# Patient Record
Sex: MALE | Race: WHITE | Employment: OTHER | URBAN - METROPOLITAN AREA
[De-identification: names, ages, dates, MRNs, and addresses within clinical notes are randomized per-mention and may not be internally consistent; named-entity substitution may affect disease eponyms.]

---

## 2019-08-03 ENCOUNTER — HOSPITAL ENCOUNTER (EMERGENCY)
Age: 69
Discharge: HOME | End: 2019-08-03
Attending: EMERGENCY MEDICINE
Payer: MEDICARE

## 2019-08-03 VITALS
WEIGHT: 224.21 LBS | HEART RATE: 92 BPM | TEMPERATURE: 97.5 F | DIASTOLIC BLOOD PRESSURE: 93 MMHG | RESPIRATION RATE: 20 BRPM | SYSTOLIC BLOOD PRESSURE: 178 MMHG | OXYGEN SATURATION: 92 % | BODY MASS INDEX: 31.39 KG/M2 | HEIGHT: 71 IN

## 2019-08-03 DIAGNOSIS — R04.0 EPISTAXIS: Primary | ICD-10-CM

## 2019-08-03 PROCEDURE — 99283 EMERGENCY DEPT VISIT LOW MDM: CPT

## 2019-08-03 PROCEDURE — 99284 EMERGENCY DEPT VISIT MOD MDM: CPT

## 2019-08-03 PROCEDURE — 74011250637 HC RX REV CODE- 250/637: Performed by: EMERGENCY MEDICINE

## 2019-08-03 RX ORDER — ASCORBIC ACID 500 MG
500 TABLET ORAL DAILY
COMMUNITY

## 2019-08-03 RX ORDER — CHLORTHALIDONE 25 MG/1
12.5 TABLET ORAL DAILY
COMMUNITY

## 2019-08-03 RX ORDER — GLUCOSAMINE SULFATE 1500 MG
2000 POWDER IN PACKET (EA) ORAL DAILY
COMMUNITY

## 2019-08-03 RX ORDER — ROSUVASTATIN CALCIUM 40 MG/1
40 TABLET, COATED ORAL
COMMUNITY

## 2019-08-03 RX ADMIN — Medication 2 SPRAY: at 09:50

## 2019-08-03 NOTE — ED TRIAGE NOTES
Nosebleed started left nares 9 PM last night, takes Xarelto. Wife states she told him to let her know if it didn't stop by midnight and when she woke up this morning was still bleeding. Patient reports it bled all night long.

## 2019-08-03 NOTE — ED PROVIDER NOTES
Perez Graves is a 78 y.o. male who presents ambulatory to the ED with a c/o epistaxis onset last night. Pt's wife reports that his nose has been bleeding from both nostril for 12 hours now. Of note, pt is on Eliquis. Also to note, pt states that he had similar sx years ago and has received many nerve blocks. Pt reports that he noes she has to get her teeth looked at but has get an appointment. Pt specifically denies chest pain, shortness of breath, n/v,d , fever, chills, numbness, tingling, abdominal pain, back pain, cough, leg swelling, dizziness or any other acute sx. Pt denies any recent travel, known sick contacts, or recent illness. PCP: Alva Lopez MD  PMHx significant for: CAD, AAA, Carotid Stenosis (left), Depression, HTN, HLD, A-Fib, Liver Failure, Macular Degeneration, Anemia, Necrotizing PNA  PSHx significant for: left Carotid Endarterectomy, cataract removal, AAA Repair  Social Hx: Tobacco: Current every day smoker EtOH: occaisional Illicit drug use: none    There are no further complaints or symptoms at this time. Signed by: mai Puga for Fernando Saha MD on August 3rd, 2019 at 9:41am.    The history is provided by the patient, the spouse and medical records. No  was used.         Past Medical History:   Diagnosis Date    AAA (abdominal aortic aneurysm) (HCC)     s/p repair    Anemia     Atrial fibrillation (HCC)     CAD (coronary artery disease)     Carotid stenosis, left     s/p repair    Depression     anger issues    High cholesterol     Hypertension     Liver failure (HCC)     Macular degeneration     Necrotizing pneumonia (HCC)        Past Surgical History:   Procedure Laterality Date    CARDIAC SURG PROCEDURE UNLIST      HX CAROTID ENDARTERECTOMY      HX HEENT      cataract    HX OTHER SURGICAL      Aneurysm repair    VASCULAR SURGERY PROCEDURE UNLIST           Family History:   Problem Relation Age of Onset    Hypertension Other        Social History     Socioeconomic History    Marital status:      Spouse name: Not on file    Number of children: Not on file    Years of education: Not on file    Highest education level: Not on file   Occupational History    Not on file   Social Needs    Financial resource strain: Not on file    Food insecurity:     Worry: Not on file     Inability: Not on file    Transportation needs:     Medical: Not on file     Non-medical: Not on file   Tobacco Use    Smoking status: Current Every Day Smoker     Packs/day: 1.00     Years: 50.00     Pack years: 50.00    Smokeless tobacco: Never Used   Substance and Sexual Activity    Alcohol use: Yes     Alcohol/week: 12.0 standard drinks     Types: 12 Cans of beer per week    Drug use: No    Sexual activity: Not on file   Lifestyle    Physical activity:     Days per week: Not on file     Minutes per session: Not on file    Stress: Not on file   Relationships    Social connections:     Talks on phone: Not on file     Gets together: Not on file     Attends Mandaeism service: Not on file     Active member of club or organization: Not on file     Attends meetings of clubs or organizations: Not on file     Relationship status: Not on file    Intimate partner violence:     Fear of current or ex partner: Not on file     Emotionally abused: Not on file     Physically abused: Not on file     Forced sexual activity: Not on file   Other Topics Concern    Not on file   Social History Narrative    Not on file         ALLERGIES: Patient has no known allergies. Review of Systems   Constitutional: Negative for chills and fever. HENT: Positive for nosebleeds. Respiratory: Negative for cough and shortness of breath. Cardiovascular: Negative for chest pain. Gastrointestinal: Negative for abdominal pain, diarrhea, nausea and vomiting. Neurological: Negative for dizziness and headaches.    All other systems reviewed and are negative. Vitals:    08/03/19 0905   BP: (!) 178/93   Pulse: 92   Resp: 20   Temp: 97.5 °F (36.4 °C)   SpO2: 92%   Weight: 101.7 kg (224 lb 3.3 oz)   Height: 5' 11\" (1.803 m)            Physical Exam   Constitutional: He is oriented to person, place, and time. He appears well-developed and well-nourished. No distress. NAD, AxOx4, speaking in complete sentences  Bleeding from L nare; blood in mouth also   HENT:   Head: Normocephalic and atraumatic. Right Ear: External ear normal.   Mouth/Throat: Oropharynx is clear and moist. No oropharyngeal exudate. Eyes: Pupils are equal, round, and reactive to light. Conjunctivae and EOM are normal. Right eye exhibits no discharge. Left eye exhibits no discharge. Neck: Normal range of motion. Neck supple. Cardiovascular: Normal rate, regular rhythm and intact distal pulses. Exam reveals no gallop and no friction rub. No murmur heard. Pulmonary/Chest: Effort normal and breath sounds normal. No respiratory distress. He has no wheezes. He has no rales. He exhibits no tenderness. Abdominal: Soft. Bowel sounds are normal. He exhibits no distension and no mass. There is no tenderness. There is no rebound and no guarding. Musculoskeletal: Normal range of motion. He exhibits no edema. Lymphadenopathy:     He has no cervical adenopathy. Neurological: He is alert and oriented to person, place, and time. No cranial nerve deficit. Coordination normal.   Skin: Skin is warm and dry. No rash noted. No erythema. Psychiatric: He has a normal mood and affect. Nursing note and vitals reviewed. [facility]       Epistaxis Management  Date/Time: 8/3/2019 9:55 AM  Performed by: Shawn Steen MD  Authorized by: Shawn Steen MD     Consent:     Consent obtained:  Verbal    Consent given by:  Patient    Risks discussed:  Bleeding, infection, nasal injury and pain    Alternatives discussed:  No treatment  Anesthesia (see MAR for exact dosages):      Anesthesia method: None  Procedure details:     Treatment site: blew out clot/ applied kai/     Treatment complexity:  Limited    Treatment episode: initial    Post-procedure details:     Assessment:  Bleeding stopped    Patient tolerance of procedure: Tolerated well, no immediate complications  Comments:      9:55 AM  Blew out clot/ fried egg sized/ applied Kai/ nasal clamp; will monitor 30 min    10:37 AM  Nosebleed resolved;       10:38 AM  Perez Bryant's  results have been reviewed with him. He has been counseled regarding his diagnosis. He verbally conveys understanding and agreement of the signs, symptoms, diagnosis, treatment and prognosis and additionally agrees to Call/ Arrange follow up as recommended with Dr. Kevin Burt MD in 24 - 48 hours. He also agrees with the care-plan and conveys that all of his questions have been answered. I have also put together some discharge instructions for him that include: 1) educational information regarding their diagnosis, 2) how to care for their diagnosis at home, as well a 3) list of reasons why they would want to return to the ED prior to their follow-up appointment, should their condition change or for concerns. Ai Badillo

## 2019-08-03 NOTE — DISCHARGE INSTRUCTIONS
Patient Education        Nosebleeds: Care Instructions  Your Care Instructions    Nosebleeds are common, especially if you have colds or allergies. Many things can cause a nosebleed. Some nosebleeds stop on their own with pressure. Others need packing. Some get cauterized (sealed). If you have gauze or other packing materials in your nose, you will need to follow up with your doctor to have the packing removed. You may need more treatment if you get nosebleeds a lot. The doctor has checked you carefully, but problems can develop later. If you notice any problems or new symptoms, get medical treatment right away. Follow-up care is a key part of your treatment and safety. Be sure to make and go to all appointments, and call your doctor if you are having problems. It's also a good idea to know your test results and keep a list of the medicines you take. How can you care for yourself at home? · If you get another nosebleed:  ? Sit up and tilt your head slightly forward. This keeps blood from going down your throat. ? Use your thumb and index finger to pinch your nose shut for 10 minutes. Use a clock. Do not check to see if the bleeding has stopped before the 10 minutes are up. If the bleeding has not stopped, pinch your nose shut for another 10 minutes. ? When the bleeding has stopped, try not to pick, rub, or blow your nose for 12 hours. Avoiding these things helps keep your nose from bleeding again. · If your doctor prescribed antibiotics, take them as directed. Do not stop taking them just because you feel better. You need to take the full course of antibiotics. To prevent nosebleeds  · Do not blow your nose too hard. · Try not to lift or strain after a nosebleed. · Raise your head on a pillow while you sleep. · Put a thin layer of a saline- or water-based nasal gel, such as NasoGel, inside your nose. Put it on the septum, which divides your nostrils.  This will prevent dryness that can cause nosebleeds. · Use a vaporizer or humidifier to add moisture to your bedroom. Follow the directions for cleaning the machine. · Do not use aspirin, ibuprofen (Advil, Motrin), or naproxen (Aleve) for 36 to 48 hours after a nosebleed unless your doctor tells you to. You can use acetaminophen (Tylenol) for pain relief. · Talk to your doctor about stopping any other medicines you are taking. Some medicines may make you more likely to get a nosebleed. · Do not use cold medicines or nasal sprays without first talking to your doctor. They can make your nose dry. When should you call for help? Call 911 anytime you think you may need emergency care. For example, call if:    · You passed out (lost consciousness).    Call your doctor now or seek immediate medical care if:    · You get another nosebleed and your nose is still bleeding after you have applied pressure 3 times for 10 minutes each time (30 minutes total).     · There is a lot of blood running down the back of your throat even after you pinch your nose and tilt your head forward.     · You have a fever.     · You have sinus pain.    Watch closely for changes in your health, and be sure to contact your doctor if:    · You get nosebleeds often, even if they stop.     · You do not get better as expected. Where can you learn more? Go to http://beni-em.info/. Enter S156 in the search box to learn more about \"Nosebleeds: Care Instructions. \"  Current as of: September 23, 2018  Content Version: 12.1  © 3118-1732 [employer]. Care instructions adapted under license by [employer] (which disclaims liability or warranty for this information). If you have questions about a medical condition or this instruction, always ask your healthcare professional. [place] [address] any warranty or liability for your use of this information.        Patient Education      Rivaroxaban (Xarelto, Xarelto Starter Pack) - (By mouth)   Why this medicine is used:   Treats and prevents blood clots. Contact a nurse or doctor right away if you have:  · Sudden or severe headache  · Back pain, numbness, tingling, weakness in your legs or feet  · Loss of bladder or bowel control  · Bloody vomit or vomit that looks like coffee grounds; bloody or black, tarry stools  · Bleeding that does not stop or bruises that do not heal     Common side effects:  · Minor bleeding or bruising  © 2017 [facility] Information is for End User's use only and may not be sold, redistributed or otherwise used for commercial purposes.

## 2022-07-07 ENCOUNTER — APPOINTMENT (OUTPATIENT)
Dept: GENERAL RADIOLOGY | Age: 72
DRG: 641 | End: 2022-07-07
Attending: EMERGENCY MEDICINE
Payer: MEDICARE

## 2022-07-07 ENCOUNTER — HOSPITAL ENCOUNTER (INPATIENT)
Age: 72
LOS: 1 days | Discharge: HOME | DRG: 641 | End: 2022-07-08
Attending: STUDENT IN AN ORGANIZED HEALTH CARE EDUCATION/TRAINING PROGRAM | Admitting: INTERNAL MEDICINE
Payer: MEDICARE

## 2022-07-07 DIAGNOSIS — I50.9 ACUTE ON CHRONIC CONGESTIVE HEART FAILURE, UNSPECIFIED HEART FAILURE TYPE (HCC): Primary | ICD-10-CM

## 2022-07-07 DIAGNOSIS — I48.91 ATRIAL FIBRILLATION WITH RAPID VENTRICULAR RESPONSE (HCC): ICD-10-CM

## 2022-07-07 LAB
ALBUMIN SERPL-MCNC: 3.6 G/DL (ref 3.5–5)
ALBUMIN/GLOB SERPL: 0.9 {RATIO} (ref 1.1–2.2)
ALP SERPL-CCNC: 112 U/L (ref 45–117)
ALT SERPL-CCNC: 25 U/L (ref 12–78)
ANION GAP SERPL CALC-SCNC: 10 MMOL/L (ref 5–15)
AST SERPL-CCNC: 28 U/L (ref 15–37)
BASOPHILS # BLD: 0.1 K/UL (ref 0–0.1)
BASOPHILS NFR BLD: 1 % (ref 0–1)
BILIRUB DIRECT SERPL-MCNC: 0.1 MG/DL (ref 0–0.2)
BILIRUB SERPL-MCNC: 0.4 MG/DL (ref 0.2–1)
BNP SERPL-MCNC: 3584 PG/ML
BUN SERPL-MCNC: 18 MG/DL (ref 6–20)
BUN/CREAT SERPL: 12 (ref 12–20)
CALCIUM SERPL-MCNC: 9.3 MG/DL (ref 8.5–10.1)
CHLORIDE SERPL-SCNC: 102 MMOL/L (ref 97–108)
CO2 SERPL-SCNC: 27 MMOL/L (ref 21–32)
CREAT SERPL-MCNC: 1.48 MG/DL (ref 0.7–1.3)
DIFFERENTIAL METHOD BLD: ABNORMAL
EOSINOPHIL # BLD: 0.3 K/UL (ref 0–0.4)
EOSINOPHIL NFR BLD: 3 % (ref 0–7)
ERYTHROCYTE [DISTWIDTH] IN BLOOD BY AUTOMATED COUNT: 18.6 % (ref 11.5–14.5)
GLOBULIN SER CALC-MCNC: 4.1 G/DL (ref 2–4)
GLUCOSE SERPL-MCNC: 156 MG/DL (ref 65–100)
HCT VFR BLD AUTO: 38.6 % (ref 36.6–50.3)
HGB BLD-MCNC: 11.6 G/DL (ref 12.1–17)
IMM GRANULOCYTES # BLD AUTO: 0.1 K/UL (ref 0–0.04)
IMM GRANULOCYTES NFR BLD AUTO: 1 % (ref 0–0.5)
LYMPHOCYTES # BLD: 1.9 K/UL (ref 0.8–3.5)
LYMPHOCYTES NFR BLD: 19 % (ref 12–49)
MAGNESIUM SERPL-MCNC: 1.9 MG/DL (ref 1.6–2.4)
MCH RBC QN AUTO: 22.9 PG (ref 26–34)
MCHC RBC AUTO-ENTMCNC: 30.1 G/DL (ref 30–36.5)
MCV RBC AUTO: 76.3 FL (ref 80–99)
MONOCYTES # BLD: 0.8 K/UL (ref 0–1)
MONOCYTES NFR BLD: 8 % (ref 5–13)
NEUTS SEG # BLD: 6.7 K/UL (ref 1.8–8)
NEUTS SEG NFR BLD: 68 % (ref 32–75)
NRBC # BLD: 0.02 K/UL (ref 0–0.01)
NRBC BLD-RTO: 0.2 PER 100 WBC
PLATELET # BLD AUTO: 457 K/UL (ref 150–400)
PMV BLD AUTO: 9.8 FL (ref 8.9–12.9)
POTASSIUM SERPL-SCNC: 3.2 MMOL/L (ref 3.5–5.1)
PROT SERPL-MCNC: 7.7 G/DL (ref 6.4–8.2)
RBC # BLD AUTO: 5.06 M/UL (ref 4.1–5.7)
SODIUM SERPL-SCNC: 139 MMOL/L (ref 136–145)
TROPONIN-HIGH SENSITIVITY: 28 NG/L (ref 0–76)
TROPONIN-HIGH SENSITIVITY: 35 NG/L (ref 0–76)
WBC # BLD AUTO: 9.9 K/UL (ref 4.1–11.1)

## 2022-07-07 PROCEDURE — 74011000250 HC RX REV CODE- 250: Performed by: INTERNAL MEDICINE

## 2022-07-07 PROCEDURE — 51798 US URINE CAPACITY MEASURE: CPT

## 2022-07-07 PROCEDURE — 99285 EMERGENCY DEPT VISIT HI MDM: CPT

## 2022-07-07 PROCEDURE — 94640 AIRWAY INHALATION TREATMENT: CPT

## 2022-07-07 PROCEDURE — 74011250636 HC RX REV CODE- 250/636: Performed by: INTERNAL MEDICINE

## 2022-07-07 PROCEDURE — 83735 ASSAY OF MAGNESIUM: CPT

## 2022-07-07 PROCEDURE — 85025 COMPLETE CBC W/AUTO DIFF WBC: CPT

## 2022-07-07 PROCEDURE — 93005 ELECTROCARDIOGRAM TRACING: CPT

## 2022-07-07 PROCEDURE — 71046 X-RAY EXAM CHEST 2 VIEWS: CPT

## 2022-07-07 PROCEDURE — 80048 BASIC METABOLIC PNL TOTAL CA: CPT

## 2022-07-07 PROCEDURE — 74011000250 HC RX REV CODE- 250: Performed by: EMERGENCY MEDICINE

## 2022-07-07 PROCEDURE — 83880 ASSAY OF NATRIURETIC PEPTIDE: CPT

## 2022-07-07 PROCEDURE — 74011250637 HC RX REV CODE- 250/637: Performed by: INTERNAL MEDICINE

## 2022-07-07 PROCEDURE — 84484 ASSAY OF TROPONIN QUANT: CPT

## 2022-07-07 PROCEDURE — 65270000046 HC RM TELEMETRY

## 2022-07-07 PROCEDURE — 36415 COLL VENOUS BLD VENIPUNCTURE: CPT

## 2022-07-07 PROCEDURE — 80076 HEPATIC FUNCTION PANEL: CPT

## 2022-07-07 RX ORDER — BUMETANIDE 0.25 MG/ML
1 INJECTION INTRAMUSCULAR; INTRAVENOUS ONCE
Status: COMPLETED | OUTPATIENT
Start: 2022-07-07 | End: 2022-07-07

## 2022-07-07 RX ORDER — MAGNESIUM SULFATE HEPTAHYDRATE 40 MG/ML
2 INJECTION, SOLUTION INTRAVENOUS ONCE
Status: COMPLETED | OUTPATIENT
Start: 2022-07-08 | End: 2022-07-08

## 2022-07-07 RX ORDER — ONDANSETRON 4 MG/1
4 TABLET, ORALLY DISINTEGRATING ORAL
Status: DISCONTINUED | OUTPATIENT
Start: 2022-07-07 | End: 2022-07-08 | Stop reason: HOSPADM

## 2022-07-07 RX ORDER — METOPROLOL SUCCINATE 50 MG/1
50 TABLET, EXTENDED RELEASE ORAL DAILY
Status: DISCONTINUED | OUTPATIENT
Start: 2022-07-08 | End: 2022-07-08 | Stop reason: HOSPADM

## 2022-07-07 RX ORDER — ONDANSETRON 2 MG/ML
4 INJECTION INTRAMUSCULAR; INTRAVENOUS
Status: DISCONTINUED | OUTPATIENT
Start: 2022-07-07 | End: 2022-07-08 | Stop reason: HOSPADM

## 2022-07-07 RX ORDER — IPRATROPIUM BROMIDE 0.5 MG/2.5ML
0.5 SOLUTION RESPIRATORY (INHALATION)
Status: DISCONTINUED | OUTPATIENT
Start: 2022-07-08 | End: 2022-07-08 | Stop reason: HOSPADM

## 2022-07-07 RX ORDER — BUMETANIDE 2 MG/1
1 TABLET ORAL DAILY
COMMUNITY
End: 2022-07-08

## 2022-07-07 RX ORDER — DULOXETIN HYDROCHLORIDE 30 MG/1
60 CAPSULE, DELAYED RELEASE ORAL DAILY
Status: DISCONTINUED | OUTPATIENT
Start: 2022-07-08 | End: 2022-07-08 | Stop reason: HOSPADM

## 2022-07-07 RX ORDER — DULOXETIN HYDROCHLORIDE 60 MG/1
60 CAPSULE, DELAYED RELEASE ORAL DAILY
COMMUNITY

## 2022-07-07 RX ORDER — ROSUVASTATIN CALCIUM 10 MG/1
40 TABLET, COATED ORAL
Status: DISCONTINUED | OUTPATIENT
Start: 2022-07-07 | End: 2022-07-08 | Stop reason: HOSPADM

## 2022-07-07 RX ORDER — SODIUM CHLORIDE 0.9 % (FLUSH) 0.9 %
5-40 SYRINGE (ML) INJECTION AS NEEDED
Status: DISCONTINUED | OUTPATIENT
Start: 2022-07-07 | End: 2022-07-08 | Stop reason: HOSPADM

## 2022-07-07 RX ORDER — LANOLIN ALCOHOL/MO/W.PET/CERES
325 CREAM (GRAM) TOPICAL DAILY
COMMUNITY

## 2022-07-07 RX ORDER — POLYETHYLENE GLYCOL 3350 17 G/17G
17 POWDER, FOR SOLUTION ORAL DAILY PRN
Status: DISCONTINUED | OUTPATIENT
Start: 2022-07-07 | End: 2022-07-08 | Stop reason: HOSPADM

## 2022-07-07 RX ORDER — EZETIMIBE 10 MG/1
10 TABLET ORAL DAILY
Status: DISCONTINUED | OUTPATIENT
Start: 2022-07-08 | End: 2022-07-08 | Stop reason: HOSPADM

## 2022-07-07 RX ORDER — EZETIMIBE 10 MG/1
10 TABLET ORAL DAILY
COMMUNITY

## 2022-07-07 RX ORDER — SODIUM CHLORIDE 0.9 % (FLUSH) 0.9 %
5-40 SYRINGE (ML) INJECTION EVERY 8 HOURS
Status: DISCONTINUED | OUTPATIENT
Start: 2022-07-07 | End: 2022-07-08 | Stop reason: HOSPADM

## 2022-07-07 RX ORDER — FLUTICASONE FUROATE AND VILANTEROL 200; 25 UG/1; UG/1
1 POWDER RESPIRATORY (INHALATION) DAILY
COMMUNITY

## 2022-07-07 RX ORDER — POTASSIUM CHLORIDE 750 MG/1
40 TABLET, FILM COATED, EXTENDED RELEASE ORAL
Status: COMPLETED | OUTPATIENT
Start: 2022-07-07 | End: 2022-07-07

## 2022-07-07 RX ORDER — LANOLIN ALCOHOL/MO/W.PET/CERES
325 CREAM (GRAM) TOPICAL
Status: DISCONTINUED | OUTPATIENT
Start: 2022-07-08 | End: 2022-07-08 | Stop reason: HOSPADM

## 2022-07-07 RX ORDER — POTASSIUM CHLORIDE 750 MG/1
20 TABLET, FILM COATED, EXTENDED RELEASE ORAL DAILY
COMMUNITY

## 2022-07-07 RX ADMIN — SODIUM CHLORIDE, PRESERVATIVE FREE 10 ML: 5 INJECTION INTRAVENOUS at 23:26

## 2022-07-07 RX ADMIN — MAGNESIUM SULFATE HEPTAHYDRATE 2 G: 40 INJECTION, SOLUTION INTRAVENOUS at 23:32

## 2022-07-07 RX ADMIN — RIVAROXABAN 20 MG: 20 TABLET, FILM COATED ORAL at 18:32

## 2022-07-07 RX ADMIN — POTASSIUM CHLORIDE 40 MEQ: 750 TABLET, FILM COATED, EXTENDED RELEASE ORAL at 15:11

## 2022-07-07 RX ADMIN — SODIUM CHLORIDE, PRESERVATIVE FREE 10 ML: 5 INJECTION INTRAVENOUS at 15:11

## 2022-07-07 RX ADMIN — BUMETANIDE 1 MG: 0.25 INJECTION INTRAMUSCULAR; INTRAVENOUS at 15:11

## 2022-07-07 RX ADMIN — ARFORMOTEROL TARTRATE: 15 SOLUTION RESPIRATORY (INHALATION) at 22:45

## 2022-07-07 NOTE — PROGRESS NOTES
1430 - pt arrived from the waiting room, cardiology in room, will consult pt's current cardiology team, post void Bladder scan 120cc, Hospitalist @ bedside    1500 - Wife leaving to go home & check on the dogs    1600 - assessment complete, pt voided    1800 - labs drawn & sent    8376 - Cardiology consult called again, Dr Ahmet Patton

## 2022-07-07 NOTE — PROGRESS NOTES
[facility]: MED RECONCILIATION        Medications added:   Zetia  Spiriva, Breo ellipta  Bumex  KCl supplement, Tumeric, Bererine    Medications removed:  IMDUR 30mg daily  Lactulose 30ml TID  Lisinopril 5mg BID  Prilosec daily  Fish Oil    Medications adjusted:  Duloxetine  Ferrous supplement  Vit D3    Information obtained from: Patient med list posted in MD note, RxQuery (data available)      Allergies: Patient has no known allergies. Prior to Admission Medications:     Medication Documentation Review Audit       Reviewed by Soni Buchanan, PHARMD (Pharmacist) on 07/07/22 at [phone]      Medication Sig Documenting Provider Last Dose Status Taking? albuterol (PROVENTIL HFA) 90 mcg/actuation inhaler Take 1 Puff by inhalation every four (4) hours as needed for Wheezing. Marissa Edmonds PA  Active Yes   ascorbic acid, vitamin C, (VITAMIN C) 500 mg tablet Take 500 mg by mouth daily. Richy Lea MD  Active Yes   berberine/herbal complex no.18 (BERBERINE-HERBAL COMB NO.18 PO) Take 2 Capsules by mouth daily. Provider, Historical  Active Yes   bumetanide (BUMEX) 2 mg tablet Take 1 mg by mouth daily. Provider, Historical  Active Yes   chlorthalidone (HYGROTEN) 25 mg tablet Take 12.5 mg by mouth daily. Richy Lea MD  Active Yes   cholecalciferol (VITAMIN D3) 1,000 unit cap Take 2,000 Units by mouth daily. Richy Lea MD  Active Yes   DULoxetine (CYMBALTA) 60 mg capsule Take 60 mg by mouth daily. Provider, Historical  Active Yes   ezetimibe (Zetia) 10 mg tablet Take 10 mg by mouth daily. Provider, Historical  Active Yes   ferrous sulfate 325 mg (65 mg iron) tablet Take 325 mg by mouth daily. Provider, Historical  Active Yes   fluticasone furoate-vilanteroL (Breo Ellipta) 200-25 mcg/dose inhaler Take 1 Puff by inhalation daily. Provider, Historical  Active Yes   metoprolol succinate (TOPROL-XL) 50 mg XL tablet Take 50 mg by mouth daily.  Richy Lea MD  Active Yes   potassium chloride SR (KLOR-CON 10) 10 mEq tablet Take 20 mEq by mouth daily. Provider, Historical  Active Yes   rivaroxaban (XARELTO) 20 mg tab tablet Take 20 mg by mouth daily (with dinner). Other, MD Richy  Active Yes   rosuvastatin (CRESTOR) 40 mg tablet Take 40 mg by mouth nightly. Other, MD Richy  Active Yes   tiotropium bromide (Spiriva Respimat) 2.5 mcg/actuation inhaler Take 2 Puffs by inhalation daily. Provider, Historical  Active Yes   TURMERIC PO Take 1 Capsule by mouth daily. Provider, Historical  Active Yes   vit A/vit C/vit E/zinc/copper (ICAPS AREDS PO) Take 2 Tabs by mouth daily. Other, MD Richy  Active Yes                    Kristen Jackson

## 2022-07-07 NOTE — ED TRIAGE NOTES
Patient arrives with complaints of not urinating     Wife reports patient has been taking diuretics and has voided 1x in 30 hours

## 2022-07-07 NOTE — H&P
[facility]awad Rae[facility]  [address]  [phone]    Admission History and Physical      NAME:  Davis Heck   :   1950   MRN:  [id number]     PCP:  Lauryn Talbot MD     Date/Time of service:  2022          Subjective:     CHIEF COMPLAINT: Not urinating, dyspnea    HISTORY OF PRESENT ILLNESS:     Mr. Gildardo Eller is a 67 y.o.  male atrial fibrillation on Xarelto, coronary artery disease, peripheral artery disease, OPD, AAA, hypertension, hyperlipidemia, macular degeneration who is admitted with dyspnea. Mr. Gildardo Eller is accompanied by by his wife is at bedside and helps provide history. Wife states that patient visited the ER last Friday and he was advised to be admitted for heart failure however he declined. She states that yesterday evening he told her he has been unable to urinate for the past 2 days. He stated to the emergency room that he was having chest pain with exertion; however he denies any during our encounter. He denies any syncope. He states he is unable to feel his heart palpitations. He denies any increased lower extremity swelling. He denies any abdominal pain nausea vomiting. No Known Allergies    Prior to Admission medications    Medication Sig Start Date End Date Taking? Authorizing Provider   ferrous sulfate 325 mg (65 mg iron) tablet Take 325 mg by mouth daily. Yes Provider, Historical   bumetanide (BUMEX) 2 mg tablet Take 1 mg by mouth daily. Yes Provider, Historical   ezetimibe (Zetia) 10 mg tablet Take 10 mg by mouth daily. Yes Provider, Historical   fluticasone furoate-vilanteroL (Breo Ellipta) 200-25 mcg/dose inhaler Take 1 Puff by inhalation daily. Yes Provider, Historical   tiotropium bromide (Spiriva Respimat) 2.5 mcg/actuation inhaler Take 2 Puffs by inhalation daily. Yes Provider, Historical   DULoxetine (CYMBALTA) 60 mg capsule Take 60 mg by mouth daily.    Yes Provider, Historical   potassium chloride SR (KLOR-CON 10) 10 mEq tablet Take 20 mEq by mouth daily. Yes Provider, Historical   TURMERIC PO Take 1 Capsule by mouth daily. Yes Provider, Historical   berberine/herbal complex no.18 (BERBERINE-HERBAL COMB NO.18 PO) Take 2 Capsules by mouth daily. Yes Provider, Historical   rivaroxaban (XARELTO) 20 mg tab tablet Take 20 mg by mouth daily (with dinner). Yes Other, MD Richy   chlorthalidone (HYGROTEN) 25 mg tablet Take 12.5 mg by mouth daily. Yes Venu, MD Richy   rosuvastatin (CRESTOR) 40 mg tablet Take 40 mg by mouth nightly. Yes Venu, MD Richy   ascorbic acid, vitamin C, (VITAMIN C) 500 mg tablet Take 500 mg by mouth daily. Yes Venu, MD Richy   vit A/vit C/vit E/zinc/copper (ICAPS AREDS PO) Take 2 Tabs by mouth daily. Yes Venu, MD Richy   cholecalciferol (VITAMIN D3) 1,000 unit cap Take 2,000 Units by mouth daily. Yes Venu, MD Richy   albuterol (PROVENTIL HFA) 90 mcg/actuation inhaler Take 1 Puff by inhalation every four (4) hours as needed for Wheezing. 10/4/15  Yes Cristiano Edmonds PA   metoprolol succinate (TOPROL-XL) 50 mg XL tablet Take 50 mg by mouth daily. Yes Other, MD Richy       Past Medical History:   Diagnosis Date    AAA (abdominal aortic aneurysm) ([facility] [address]     s/p repair    Anemia     Atrial fibrillation (HCC)     CAD (coronary artery disease)     Carotid stenosis, left     s/p repair    Depression     anger issues    High cholesterol     Hypertension     Liver failure (HCC)     Macular degeneration     Necrotizing pneumonia (HCC)         Past Surgical History:   Procedure Laterality Date    CARDIAC SURG PROCEDURE UNLIST      HX CAROTID ENDARTERECTOMY      HX HEENT      cataract    HX OTHER SURGICAL      Aneurysm repair    VASCULAR SURGERY PROCEDURE UNLIST         Social History     Tobacco Use    Smoking status: Current Every Day Smoker     Packs/day: 1.00     Years: 50.00     Pack years: 50.00    Smokeless tobacco: Never Used   Substance Use Topics    Alcohol use:  Yes Alcohol/week: 12.0 standard drinks     Types: 12 Cans of beer per week        Family History   Problem Relation Age of Onset    Hypertension Other    family Hx Father hx of Carotid Artery Stenosis; Cerebrovascular Accident. Mother: Medical history unknown. Review of Systems:  (bold if positive, if negative)    Gen:  Eyes:  ENT:  CVS:  Pulm:   dyspnea,GI:  :  trouble urinating  MS:  Skin:  Psych:  Endo:  Hem:  Renal:  Neuro:            Objective:      VITALS:    Vital signs reviewed; most recent are:    Visit Vitals  /69   Pulse (!) 103   Temp 97.8 °F (36.6 °C)   Resp 25   Ht 5' 10\" (1.778 m)   Wt 93 kg (205 lb)   SpO2 95%   BMI 29.41 kg/m²     SpO2 Readings from Last 6 Encounters:   07/07/22 95%   08/03/19 92%   10/04/15 93%   03/11/14 94%   01/31/14 94%            Intake/Output Summary (Last 24 hours) at 7/7/2022 1642  Last data filed at 7/7/2022 1630  Gross per 24 hour   Intake --   Output 500 ml   Net -500 ml        Exam:     Physical Exam:    Gen:  Well-developed, well-nourished, in no acute distress  HEENT:  Pink conjunctivae, PERRL, hard of hearing  Resp:  No accessory muscle use, crackles bilateral bases  Card:  RRR, No murmurs, normal S1, S2, bilateral peripheral edema  Abd:  Soft, non-tender, non-distended, normoactive bowel sounds are present  Musc:  No cyanosis or clubbing  Skin:  No rashes or ulcers, skin turgor is good  Neuro:  Cranial nerves 3-12 are grossly intact, follows commands appropriately  Psych:  Oriented to person, place, and time, Alert with good insight      Labs:    Recent Labs     07/07/22  1053   WBC 9.9   HGB 11.6*   HCT 38.6   *     Recent Labs     07/07/22  1053      K 3.2*      CO2 27   *   BUN 18   CREA 1.48*   CA 9.3     No results found for: GLUCPOC  No results for input(s): PH, PCO2, PO2, HCO3, FIO2 in the last 72 hours. No results for input(s): INR, INREXT in the last 72 hours.     Radiology and EKG reviewed: Chest x-ray with interstitial prominence     Old Records reviewed in [facility]       Assessment/Plan:      Dyspnea/ Acute on chronic heart failure (HCC) (7/7/2022)/ Coronary atherosclerosis of native coronary artery (1/28/2014): Unclear trigger. Chest x-ray with interstitial prominence and elevated proBNP. Initial troponin negative; continue to trend. Check echo. Status post IV Bumex; continue as tolerated. Check respiratory viral panel develops any fever. Monitor ins and outs. Cardiology consult. BERONICA: Suspect cardiorenal due to heart failure. Monitor renal function with diuretics. Atrial fibrillation: HR slightly elevated. Continue home beta-blocker. Continue home Xarelto    COPD: Does not appear in acute exacerbation. arformoterol and budesonide in place of home Breo. Continue Spiriva. Concern for urinary retention: No current evidence of any urinary retention. Continue to monitor post voids    Hypertension: Continue home beta-blocker. Diuretics as above. Hyperlipidemia: Continue home Zetia and statin    Anemia: Continue home iron. PAD (peripheral artery disease) ([facility] [address] (1/28/2014): Continue home Xarelto and statin. AAA: Follow-up outpatient. Depression: Continue home Cymbalta.        Risk of deterioration: high      Total time spent with patient: 48 Minutes **I personally saw and examined the patient during this time period**                 Care Plan discussed with: Patient, Family and Nursing Staff    Discussed:  Care Plan    Prophylaxis: Xarelto    Probable Disposition:  Home w/Family           ___________________________________________________    Attending Physician: Nitish Addison DO

## 2022-07-07 NOTE — ED PROVIDER NOTES
29-year-old male with a history of anemia, atrial fibrillation, CAD, AAA status postrepair, liver failure, necrotizing pneumonia presents with chest pain and shortness of breath for the past week. States that chest pain is worse with exertion. He also endorses orthopnea. He checked in for urinary retention. He states that he he has not gone for over 30 hours. He tells me that he was recently in [place] in a hospital and was diagnosed with a heart attack or congestive heart failure. He states that he stayed there for 2 hours and did not stay. History is limited as the patient is extremely hard of hearing. Urinary Retention          Past Medical History:   Diagnosis Date    AAA (abdominal aortic aneurysm) (HCC)     s/p repair    Anemia     Atrial fibrillation ([address]     CAD (coronary artery disease)     Carotid stenosis, left     s/p repair    Depression     anger issues    High cholesterol     Hypertension     Liver failure (HCC)     Macular degeneration     Necrotizing pneumonia (HCC)        Past Surgical History:   Procedure Laterality Date    CARDIAC SURG PROCEDURE UNLIST      HX CAROTID ENDARTERECTOMY      HX HEENT      cataract    HX OTHER SURGICAL      Aneurysm repair    VASCULAR SURGERY PROCEDURE UNLIST           Family History:   Problem Relation Age of Onset    Hypertension Other        Social History     Socioeconomic History    Marital status:      Spouse name: Not on file    Number of children: Not on file    Years of education: Not on file    Highest education level: Not on file   Occupational History    Not on file   Tobacco Use    Smoking status: Current Every Day Smoker     Packs/day: 1.00     Years: 50.00     Pack years: 50.00    Smokeless tobacco: Never Used   Substance and Sexual Activity    Alcohol use:  Yes     Alcohol/week: 12.0 standard drinks     Types: 12 Cans of beer per week    Drug use: No    Sexual activity: Not on file   Other Topics Concern  Not on file   Social History Narrative    Not on file     Social Determinants of Health     Financial Resource Strain:     Difficulty of Paying Living Expenses: Not on file   Food Insecurity:     Worried About Running Out of Food in the Last Year: Not on file    Becki of Food in the Last Year: Not on file   Transportation Needs:     Lack of Transportation (Medical): Not on file    Lack of Transportation (Non-Medical): Not on file   Physical Activity:     Days of Exercise per Week: Not on file    Minutes of Exercise per Session: Not on file   Stress:     Feeling of Stress : Not on file   Social Connections:     Frequency of Communication with Friends and Family: Not on file    Frequency of Social Gatherings with Friends and Family: Not on file    Attends [employer] Services: Not on file    Active Member of [address] [place] [address] [employer] or Organizations: Not on file    Attends Club or Organization Meetings: Not on file    Marital Status: Not on file   Intimate Partner Violence:     Fear of Current or Ex-Partner: Not on file    Emotionally Abused: Not on file    Physically Abused: Not on file    Sexually Abused: Not on file   Housing Stability:     Unable to Pay for Housing in the Last Year: Not on file    Number of [place] in the Last Year: Not on file    Unstable Housing in the Last Year: Not on file                     ALLERGIES: Patient has no known allergies. Review of Systems   Reason unable to perform ROS: Extremely hard of hearing. Vitals:    07/07/22 1025   BP: (!) 118/56   Pulse: 92   Resp: 19   Temp: 98 °F (36.7 °C)   SpO2: 97%   Weight: 93 kg (205 lb)   Height: 5' 10\" (1.778 m)            Physical Exam  Vitals and nursing note reviewed. Constitutional:       General: He is not in acute distress. HENT:      Head: Normocephalic and atraumatic. Eyes:      General: No scleral icterus. Conjunctiva/sclera: Conjunctivae normal.      Pupils: Pupils are equal, round, and reactive to light. Neck:      Trachea: No tracheal deviation. Cardiovascular:      Rate and Rhythm: Normal rate and regular rhythm. Pulmonary:      Effort: Pulmonary effort is normal. No respiratory distress. Breath sounds: No stridor. Rales (Bilateral lower) present. Abdominal:      General: There is no distension. Palpations: Abdomen is soft. Tenderness: There is no abdominal tenderness. Genitourinary:     Comments: deferred  Musculoskeletal:         General: No deformity. Cervical back: No rigidity. Skin:     General: Skin is warm and dry. Neurological:      General: No focal deficit present. Mental Status: He is alert. Psychiatric:         Mood and Affect: Mood normal.         Behavior: Behavior normal.          [facility]  ED Course as of 07/07/22 1222   Thu Jul 07, 2022   86 70-year-old male presents with chest pain, shortness of breath, lack of urination with differential diagnosis of acute renal failure, congestive heart failure, ACS, urinary retention. Bladder scan performed upon arrival and showed 40 cc. [TT]   1046 EKG shows atrial fibrillation at rate of 127, left axis deviation, nonspecific ST and T wave abnormalities [TT]   1120 Chest x-ray per my interpretation shows airway midline, no obvious bony deformity, normal cardiac silhouette, no pleural effusions, no free air, no pneumothorax, moderate pulmonary vascular congestion and pulmonary edema, normal mediastinum   [TT]   1221 Patient remains in the waiting room due to no staffed ED beds. He remains in no acute distress. His proBNP is returned elevated. His EKG shows rapid atrial fibrillation. Will admit for further management. [TT]      ED Course User Index  [TT] Anderson Su MD       Procedures          Perfect Serve Consult for Admission  12:22 PM    ED Room Number: CW/CW  Patient Name and age:  Maurice Salinas 67 y.o.  male  Working Diagnosis:   1.  Acute on chronic congestive heart failure, unspecified heart failure type ([facility]    2. Atrial fibrillation with rapid ventricular response ([facility]        COVID-19 Suspicion:  no  Sepsis present:  no  Reassessment needed: N/A  Code Status:  Full Code  Readmission: no  Isolation Requirements:  no  Recommended Level of Care:  telemetry  Department: [facility] - [phone]  Other: Giving Bumex. May need rate control. Patient still in waiting room and not being monitored but will reassess when he comes back. Flori Vaca.  Jose L Truong MD

## 2022-07-08 ENCOUNTER — APPOINTMENT (OUTPATIENT)
Dept: NON INVASIVE DIAGNOSTICS | Age: 72
DRG: 641 | End: 2022-07-08
Attending: INTERNAL MEDICINE
Payer: MEDICARE

## 2022-07-08 VITALS
TEMPERATURE: 97.9 F | HEIGHT: 70 IN | WEIGHT: 205.03 LBS | HEART RATE: 88 BPM | DIASTOLIC BLOOD PRESSURE: 73 MMHG | RESPIRATION RATE: 16 BRPM | BODY MASS INDEX: 29.35 KG/M2 | SYSTOLIC BLOOD PRESSURE: 127 MMHG | OXYGEN SATURATION: 95 %

## 2022-07-08 LAB
ALBUMIN SERPL-MCNC: 3.1 G/DL (ref 3.5–5)
ALBUMIN/GLOB SERPL: 0.9 {RATIO} (ref 1.1–2.2)
ALP SERPL-CCNC: 92 U/L (ref 45–117)
ALT SERPL-CCNC: 20 U/L (ref 12–78)
ANION GAP SERPL CALC-SCNC: 8 MMOL/L (ref 5–15)
AST SERPL-CCNC: 20 U/L (ref 15–37)
BASOPHILS # BLD: 0.1 K/UL (ref 0–0.1)
BASOPHILS NFR BLD: 1 % (ref 0–1)
BILIRUB SERPL-MCNC: 0.5 MG/DL (ref 0.2–1)
BUN SERPL-MCNC: 19 MG/DL (ref 6–20)
BUN/CREAT SERPL: 18 (ref 12–20)
CALCIUM SERPL-MCNC: 8.4 MG/DL (ref 8.5–10.1)
CHLORIDE SERPL-SCNC: 104 MMOL/L (ref 97–108)
CO2 SERPL-SCNC: 29 MMOL/L (ref 21–32)
CREAT SERPL-MCNC: 1.03 MG/DL (ref 0.7–1.3)
DIFFERENTIAL METHOD BLD: ABNORMAL
ECHO AO ASC DIAM: 3.2 CM
ECHO AO ASCENDING AORTA INDEX: 1.52 CM/M2
ECHO AV AREA PEAK VELOCITY: 2.2 CM2
ECHO AV AREA PEAK VELOCITY: 2.3 CM2
ECHO AV AREA VTI: 2.6 CM2
ECHO AV AREA/BSA VTI: 1.2 CM2/M2
ECHO AV MEAN GRADIENT: 7 MMHG
ECHO AV MEAN VELOCITY: 1.2 M/S
ECHO AV PEAK GRADIENT: 13 MMHG
ECHO AV PEAK GRADIENT: 14 MMHG
ECHO AV PEAK VELOCITY: 1.8 M/S
ECHO AV PEAK VELOCITY: 1.9 M/S
ECHO AV VTI: 32.4 CM
ECHO EST RA PRESSURE: 8 MMHG
ECHO LA DIAMETER INDEX: 1.42 CM/M2
ECHO LA DIAMETER: 3 CM
ECHO LA VOL 2C: 61 ML (ref 18–58)
ECHO LA VOL 4C: 66 ML (ref 18–58)
ECHO LA VOL BP: 65 ML (ref 18–58)
ECHO LA VOL/BSA BIPLANE: 31 ML/M2 (ref 16–34)
ECHO LA VOLUME AREA LENGTH: 68 ML
ECHO LA VOLUME INDEX A2C: 29 ML/M2 (ref 16–34)
ECHO LA VOLUME INDEX A4C: 31 ML/M2 (ref 16–34)
ECHO LA VOLUME INDEX AREA LENGTH: 32 ML/M2 (ref 16–34)
ECHO LV E' LATERAL VELOCITY: 6 CM/S
ECHO LV E' SEPTAL VELOCITY: 3 CM/S
ECHO LV EDV A2C: 157 ML
ECHO LV EDV A4C: 145 ML
ECHO LV EDV BP: 152 ML (ref 67–155)
ECHO LV EDV INDEX A4C: 69 ML/M2
ECHO LV EDV INDEX BP: 72 ML/M2
ECHO LV EDV NDEX A2C: 74 ML/M2
ECHO LV EF PHYSICIAN: 55 %
ECHO LV EJECTION FRACTION A2C: 45 %
ECHO LV EJECTION FRACTION A4C: 37 %
ECHO LV EJECTION FRACTION BIPLANE: 41 % (ref 55–100)
ECHO LV ESV A2C: 86 ML
ECHO LV ESV A4C: 92 ML
ECHO LV ESV BP: 89 ML (ref 22–58)
ECHO LV ESV INDEX A2C: 41 ML/M2
ECHO LV ESV INDEX A4C: 44 ML/M2
ECHO LV ESV INDEX BP: 42 ML/M2
ECHO LV FRACTIONAL SHORTENING: 12 % (ref 28–44)
ECHO LV INTERNAL DIMENSION DIASTOLE INDEX: 2.46 CM/M2
ECHO LV INTERNAL DIMENSION DIASTOLIC: 5.2 CM (ref 4.2–5.9)
ECHO LV INTERNAL DIMENSION SYSTOLIC INDEX: 2.18 CM/M2
ECHO LV INTERNAL DIMENSION SYSTOLIC: 4.6 CM
ECHO LV IVSD: 1.1 CM (ref 0.6–1)
ECHO LV MASS 2D: 220.8 G (ref 88–224)
ECHO LV MASS INDEX 2D: 104.6 G/M2 (ref 49–115)
ECHO LV POSTERIOR WALL DIASTOLIC: 1.1 CM (ref 0.6–1)
ECHO LV RELATIVE WALL THICKNESS RATIO: 0.42
ECHO LVOT AREA: 4.5 CM2
ECHO LVOT AV VTI INDEX: 0.55
ECHO LVOT DIAM: 2.4 CM
ECHO LVOT MEAN GRADIENT: 2 MMHG
ECHO LVOT PEAK GRADIENT: 3 MMHG
ECHO LVOT PEAK VELOCITY: 0.9 M/S
ECHO LVOT STROKE VOLUME INDEX: 38.4 ML/M2
ECHO LVOT SV: 80.9 ML
ECHO LVOT VTI: 17.9 CM
ECHO MV A VELOCITY: 0.84 M/S
ECHO MV E DECELERATION TIME (DT): 177.9 MS
ECHO MV E VELOCITY: 1.43 M/S
ECHO MV E/A RATIO: 1.7
ECHO MV E/E' LATERAL: 23.83
ECHO MV E/E' RATIO (AVERAGED): 35.75
ECHO MV E/E' SEPTAL: 47.67
ECHO MV REGURGITANT PEAK GRADIENT: 130 MMHG
ECHO MV REGURGITANT PEAK VELOCITY: 5.7 M/S
ECHO PULMONARY ARTERY END DIASTOLIC PRESSURE: 16 MMHG
ECHO PV MAX VELOCITY: 0.9 M/S
ECHO PV PEAK GRADIENT: 3 MMHG
ECHO PV REGURGITANT MAX VELOCITY: 2 M/S
ECHO RIGHT VENTRICULAR SYSTOLIC PRESSURE (RVSP): 69 MMHG
ECHO RV FREE WALL PEAK S': 8 CM/S
ECHO RV INTERNAL DIMENSION: 4.4 CM
ECHO RV TAPSE: 1.7 CM (ref 1.7–?)
ECHO TV REGURGITANT MAX VELOCITY: 3.9 M/S
ECHO TV REGURGITANT PEAK GRADIENT: 61 MMHG
EOSINOPHIL # BLD: 0.3 K/UL (ref 0–0.4)
EOSINOPHIL NFR BLD: 5 % (ref 0–7)
ERYTHROCYTE [DISTWIDTH] IN BLOOD BY AUTOMATED COUNT: 17.8 % (ref 11.5–14.5)
GLOBULIN SER CALC-MCNC: 3.4 G/DL (ref 2–4)
GLUCOSE SERPL-MCNC: 102 MG/DL (ref 65–100)
HCT VFR BLD AUTO: 32.1 % (ref 36.6–50.3)
HGB BLD-MCNC: 9.1 G/DL (ref 12.1–17)
IMM GRANULOCYTES # BLD AUTO: 0 K/UL (ref 0–0.04)
IMM GRANULOCYTES NFR BLD AUTO: 0 % (ref 0–0.5)
LYMPHOCYTES # BLD: 1.3 K/UL (ref 0.8–3.5)
LYMPHOCYTES NFR BLD: 19 % (ref 12–49)
MAGNESIUM SERPL-MCNC: 3 MG/DL (ref 1.6–2.4)
MCH RBC QN AUTO: 22.1 PG (ref 26–34)
MCHC RBC AUTO-ENTMCNC: 28.3 G/DL (ref 30–36.5)
MCV RBC AUTO: 77.9 FL (ref 80–99)
MONOCYTES # BLD: 0.9 K/UL (ref 0–1)
MONOCYTES NFR BLD: 14 % (ref 5–13)
NEUTS SEG # BLD: 4.2 K/UL (ref 1.8–8)
NEUTS SEG NFR BLD: 61 % (ref 32–75)
NRBC # BLD: 0 K/UL (ref 0–0.01)
NRBC BLD-RTO: 0 PER 100 WBC
PLATELET # BLD AUTO: 322 K/UL (ref 150–400)
PMV BLD AUTO: 9.3 FL (ref 8.9–12.9)
POTASSIUM SERPL-SCNC: 3.3 MMOL/L (ref 3.5–5.1)
PROT SERPL-MCNC: 6.5 G/DL (ref 6.4–8.2)
RBC # BLD AUTO: 4.12 M/UL (ref 4.1–5.7)
SODIUM SERPL-SCNC: 141 MMOL/L (ref 136–145)
TROPONIN-HIGH SENSITIVITY: 30 NG/L (ref 0–76)
WBC # BLD AUTO: 6.9 K/UL (ref 4.1–11.1)

## 2022-07-08 PROCEDURE — 80053 COMPREHEN METABOLIC PANEL: CPT

## 2022-07-08 PROCEDURE — 85025 COMPLETE CBC W/AUTO DIFF WBC: CPT

## 2022-07-08 PROCEDURE — 83735 ASSAY OF MAGNESIUM: CPT

## 2022-07-08 PROCEDURE — 74011000250 HC RX REV CODE- 250: Performed by: INTERNAL MEDICINE

## 2022-07-08 PROCEDURE — 93306 TTE W/DOPPLER COMPLETE: CPT

## 2022-07-08 PROCEDURE — 94761 N-INVAS EAR/PLS OXIMETRY MLT: CPT

## 2022-07-08 PROCEDURE — 84484 ASSAY OF TROPONIN QUANT: CPT

## 2022-07-08 PROCEDURE — 36415 COLL VENOUS BLD VENIPUNCTURE: CPT

## 2022-07-08 PROCEDURE — 74011250637 HC RX REV CODE- 250/637: Performed by: INTERNAL MEDICINE

## 2022-07-08 PROCEDURE — 94640 AIRWAY INHALATION TREATMENT: CPT

## 2022-07-08 RX ADMIN — DULOXETINE HYDROCHLORIDE 60 MG: 30 CAPSULE, DELAYED RELEASE ORAL at 08:49

## 2022-07-08 RX ADMIN — METOPROLOL SUCCINATE 50 MG: 50 TABLET, EXTENDED RELEASE ORAL at 08:49

## 2022-07-08 RX ADMIN — FERROUS SULFATE TAB 325 MG (65 MG ELEMENTAL FE) 325 MG: 325 (65 FE) TAB at 08:49

## 2022-07-08 RX ADMIN — EZETIMIBE 10 MG: 10 TABLET ORAL at 08:49

## 2022-07-08 RX ADMIN — ARFORMOTEROL TARTRATE: 15 SOLUTION RESPIRATORY (INHALATION) at 07:32

## 2022-07-08 RX ADMIN — IPRATROPIUM BROMIDE 0.5 MG: 0.5 SOLUTION RESPIRATORY (INHALATION) at 07:22

## 2022-07-08 RX ADMIN — IPRATROPIUM BROMIDE 0.5 MG: 0.5 SOLUTION RESPIRATORY (INHALATION) at 13:15

## 2022-07-08 RX ADMIN — SODIUM CHLORIDE, PRESERVATIVE FREE 10 ML: 5 INJECTION INTRAVENOUS at 05:37

## 2022-07-08 NOTE — PROGRESS NOTES
Problem: Patient Education: Go to Patient Education Activity  Goal: Patient/Family Education  Outcome: Progressing Towards Goal     Problem: Heart Failure: Day 1  Goal: Activity/Safety  Outcome: Progressing Towards Goal  Goal: Consults, if ordered  Outcome: Progressing Towards Goal  Goal: Diagnostic Test/Procedures  Outcome: Progressing Towards Goal  Goal: Nutrition/Diet  Outcome: Progressing Towards Goal

## 2022-07-08 NOTE — NURSE NAVIGATOR
Chart reviewed by Heart Failure Nurse Navigator. Heart Failure database completed. EF:  Pending     ACEi/ARB/ARNi: **    BB: Metoprolol succinate 50 mg daily    Aldosterone Antagonist: **    Obstructive Sleep Apnea Screening:   Referred to Sleep Medicine:     CRT **. NYHA Functional Class **. Heart Failure Teach Back in Patient Education. Heart Failure Avoiding Triggers on Discharge Instructions. Cardiologist: Dr Patsy Mendoza discharge follow up phone call to be made within 48-72 hours of discharge.

## 2022-07-08 NOTE — PROGRESS NOTES
Dr. Tiki Oliver last office note:      Hern[facility] Hoskins 1950 Office/Outpatient VisitVisit Date: Tue, Mar 15, 2022 11:15 amProvider: Daniel Osborne MD (Assistant: Caro Wallace LPN )Location: [facility])[address] [place]. [address] [phone] signed by Beverley Franklin MD on  03/15/2022 12:01:25 PM                         Subjective:CC: Mr. Isaura Montilla is a 66year old White male. His primary care physician is Muna Wheatley MD.  This is a 6  month follow-up visit. He presents today with a complaint of shortness of breath. Patient verbalized medications unchanged since last office visit. He has a history of,  atrial fibrillation, carotid artery stenosis without cerebral infarction, coronary artery disease of the native coronary artery, hypercholesterolemia, and essential hypertension. unruptured abdominal aortic aneurysm diagnosed in fixed by Dr Chalo Casas (stented) HPI:   Abdominal aortic aneurysm, without rupture noted. Repaired by Dr. Chalo Casas, to have follow up appt in 2 weeks. Pt reports he is doing good. Still smoking. Has increased walking for exercise. Occlusion and stenosis of unspecified carotid artery noted. Coronary Artery Disease:MD Notes: NSR, NEED TO REVIEW BLOOD WORK, CANNOT QUIT SMOKING OR DRINKING, GOING ON A CRUISE TO [place] He is here today for routine follow-up. Mr. Isaura Montilla has a prior history of a myocardial infarction and is currently on a beta blocker. Currently, his treatment regimen consists of Toprol-XL and Crestor. Mr. Isaura Montilla is compliant with tobacco avoidance and taking medications as recommended and prescribed. Hypercholesterolemia: Current treatment includes Crestor. Compliance with treatment has been good. Regarding hypertension:Type Primary Hypertension Currently, his treatment regimen consists of a diuretic ( bumetanide ).     Atrial Fibrillation:MD Notes: no bleeding, cochlear inplant, meds sound Regarding parosxymal atrial fibrillation,Current related medications include a beta blocker for rate control and Xarelto (Rivaroxaban). He is compliant with his medication regimen. Denies any falls or unusual bleeding. Labs done this week with pcp, was told everything okay. Regarding dyspnea/shortness of breath: This tends to be worse with exertion. ROS: Discussed relevant lab and imaging studies along with the plan of treatment with the nurse. EYES:  Negative for blurred vision and eye pain. E/N/T:  Negative for epistaxis and hoarseness. CARDIOVASCULAR:  Please see HPI. RESPIRATORY:  Negative for cough and hemoptysis. GASTROINTESTINAL:  Negative for abdominal pain and dysphagia. MUSCULOSKELETAL:  Negative for arthralgias and back pain. NEUROLOGICAL:  Negative for headaches, paresthesias, and weakness. HEMATOLOGIC/LYMPHATIC:  Negative for easy bruising, bleeding, and lymphadenopathy. PSYCHIATRIC:  No symptoms reported. Past Medical History / Family History / Social History: Last Reviewed on 3/15/2022 11:40 AM by Roderick Luong Medical History: Atrial Fibrillation: paroxysmalCarotid Artery StenosisHypercholesterolemiaHypertensionMyocardial Infarction: remote MI; COPDPNA Gastroesophageal Reflux Disease? Liver failure r/t statin use AMS 10/26/2017- in setting of prednisone and Levaquin for COPD exacerbation- hospitalized at [address]: was last done 2020 COVID-19 VACCINE: was last done 01/2020 Past Cardiac Procedures/Tests:Echocardiogram on 2/14/2020 EF 45% Mild MR. Mod pulmonary HTN. Surgical History: Surgical/Procedural History: Carotid endaterectomy ( 2013)AAA repair ( 8/2013)[facility] PNUEMONIACochlear Implant Surgery 08/03/2021 Cardiac Surgery/Procedures:Cardiac Catheterization:  05/24/2013; single vessel CAD and a 5 cm AAA by catheterization; need CT to evaluate and consult vascularCardiac Cath 6/29/2017 Significant single-vessel CAD.  Mild-mod CAD involving the LAD EF 50-55% Family History: Family History: Father: Carotid Artery Stenosis; Cerebrovascular Accident. Mother: Medical history unknown. Social History: Social History: Occupation: Retired (Prior occupation: ) Marital Status:  Children: 1 child Tobacco/Alcohol/Supplements: Last Reviewed on 3/15/2022 11:40 AM by Pinky Aragon/ALCOHOL/SUPPLEMENTS Tobacco: He has a past history of cigarette smoking; quit 2020. Alcohol: Drinks alcohol occasionally. Caffeine:  He admits to consuming caffeine via soda ( 2 servings per day ). Substance Abuse History: Last Reviewed on 3/15/2022 11:40 AM by Mykel Dalton Use/Abuse: Unremarkable Mental Health History: Last Reviewed on 3/15/2022 11:40 AM by Soledad Costa [address] [place] [address]  History: Major Depression Communicable Diseases (eg STDs):  Last Reviewed on 3/15/2022 11:40 AM by Tasha Robinm: Last Reviewed on 3/15/2022 11:40 AM by Raji AragonLipitor: Liver function abnormalities Prednisone:   (Adverse Reaction)Amiodarone HCl: shortness of breath  (Adverse Reaction)Current Medications: Last Reviewed on 3/15/2022 11:40 AM by Julius Isidro Ellipta 100-25 mcg/dose Inhalation Blister, With Inhalation Device [inhale 1 puff by inhalation route once daily at the same time each day]potassium chloride 20 mEq oral tablet, extended release [take 1 tablet (20 meq) by oral route once daily with food]tiotropium bromide 1.25 mcg/actuation Inhalation Mist [inhale 2 puffs (2.5 mcg) by inhalation route once daily]cholecalciferol (vitamin D3) 125 mcg (5,000 unit) oral capsule [1 po qd]metoprolol succinate 50 mg oral Tablet, Extended Release 24 hr [take 1 tablet (50 mg) by oral route once daily]turmeric  [as directed daily OTC]Berberine supplement OTC  [as directed by directions on label ]Crestor 40 mg oral tablet [1 po qd]DULoxetine 30 mg oral capsule,delayed release (enteric coated) [2 po qd]AREDS vitamin po qd Xarelto 20 mg oral tablet [Take 1 tablet by mouth once daily]Chlorthalidone 25 mg oral tablet [1/2 po qd]bumetanide 1 mg oral tablet [take 1 tablet (1 mg) by oral route once daily]ezetimibe 10 mg oral tablet [take 1 tablet (10 mg) by oral route once daily]Objective:Vitals: Historical: 9/15/2021  BP:   150/81 mm Hg ( (left arm, , sitting, );) 9/15/2021  Wt:   210lbsCurrent: 3/15/2022 11:40:16 AMHt:  5 ft, 11 in; Wt: 214 lbs;  BMI: 29. 8BP: 138/79 mm Hg (left arm, sitting);  P: 88 bpm (left arm (BP Cuff), sitting);  sCr: 1.11 mg/dL;  GFR: 63.75Exams: GENERAL:  Alert, oriented to person, place and time x3. EYES:  Normal lids without xanthelasma; conjunctiva unremarkable; no scleral icterus. HEENT:  Face symmetric, voice clear, extraocular muscles intact. NECK:  Supple. No bruits, No JVD. LUNGS:  Clear to auscultation. No rales, no wheezing. CARDIAC:  Regular rate and rhythm. PMI not displaced. ABDOMEN:  Soft. Positive bowel sounds. Non-tender. MUSCULOSKELETAL:  Normal range of motion, strength and tone. EXTREMITIES:  No edema. Good muscle tone and strength. SKIN: No significant rashes or lesions; no suspicious moles. NEUROLOGICAL:  No focal deficits, cranial nerves II-XII are grossly intact. PSYCHIATRIC:  Appropriate affect and demeanor; normal speech pattern; grossly normal memory. Procedures: Abdominal aortic aneurysm, without ruptureECG INTERPRETATION: See scanned EKG for results.   Assessment: I71.4   Abdominal aortic aneurysm, without rupture   I65.29   Occlusion and stenosis of unspecified carotid artery   I25.10   Atherosclerotic heart disease of native coronary artery without angina pectoris   E78.0   Pure hypercholesterolemia   I10   Essential (primary) hypertension   I48.0   Paroxysmal atrial fibrillation   I48.0   Paroxysmal atrial fibrillation   I25.1   Atherosclerotic heart disease of native coronary artery   E78.00   Pure hypercholesterolemia   I10   Essential (primary) hypertension   R06.02   Shortness of breath   ORDERS: Procedures Ordered:   07166  Education and train for pt self-mgmt by qualified, nonphysician, manuel 30 minutes; individual pt  (Send-Out)        27396  Electrocardiogram, routine with at least 12 leads; with interpretation and report  (In-House)      Other Orders:     Not an eligible candidate for ACE or ARB therapy - documented  (In-House)          LDL-C >= 100 mg/dL  (Send-Out)        0556F  Plan of care to achieve lipid control documented (CAD)  (In-House)        4013F  Statin therapy rxd/currently taken(CAD)  (In-House)        1268W3E  Aspirin or clopidogrel, not prescribed for medical reasons  (In-House)        CHRISTIE SHAW  (Send-Out)        Cierra Meza  (Send-Out)        [id number]  Queried Patient for Tobacco Use  (Send-Out)      Plan: Abdominal aortic aneurysm, without rupture  Orders:   [id number]  Electrocardiogram, routine with at least 12 leads; with interpretation and report  (In-House)      Atherosclerotic heart disease of native coronary artery without angina pectorisCAD: with ACE/ARB Rx without Diabetes or LVSD, Patient not an eligible candidate for ACE or ARB Therapy (documented) Lipid Control LDL >= 100 mg/dL Plan of care to achieve lipid control documented Statin therapy prescribed or currently being taken with Antiplatelet Therapy Aspirin or Clopidogrel NOT Prescribed d/t Medical Reason Beta Blocker: Prior MI: Yes, *  Plan of care for atrial fibrillation: Follow up visit [id number] score remains greater than 1. He is tolerating rate and rhythm control with prescribed medications. The patient remains anticoagulated with Xarelto (Rivaroxaban), patient continues to tolerate this medication. .  Medication list has been reviewed. Continue current medications. Smoking status: Mr. Noelle Magallon currently smokes cigarettes. Smoking cessation discussed with patient. The counseling session lasted less than 10 minutes. Will obtain labs from PCP for review. Schedule a follow-up appointment in 6 months. Discussed with patient diet, exercise plan and lifestyle modifications. The above note was transcribed by Dena Harris and authenticated by Dr. Margarita Macias prior to sign off. Orders:     Not an eligible candidate for ACE or ARB therapy - documented  (In-House)          LDL-C >= 100 mg/dL  (Send-Out)        0556F  Plan of care to achieve lipid control documented (CAD)  (In-House)        4013F  Statin therapy rxd/currently taken(CAD)  (In-House)        4013P7A  Aspirin or clopidogrel, not prescribed for medical reasons  (In-House)        CHRISTIE SHAW  (Send-Out)        Qian Fox  (Send-Out)        [id number]  Queried Patient for Tobacco Use  (Send-Out)        10847  Education and train for pt self-mgmt by qualified, nonphysician, manuel 30 minutes; individual pt  (Send-Out)        Patient Education Handouts:   COV Atrial Fibrillation    COV Coronary Artery Disease    COV Heart Healthy Diet    COV Hypertension  Patient Recommendations: For  Atherosclerotic heart disease of native coronary artery without angina pectoris:Aspirin or clopidogrel, not prescribed for medical reasons *  Plan of care for atrial fibrillation: Please continue your anticoagulation with Xarelto. Your medication list has been reviewed. Continue current medications. Schedule a follow-up visit in 6 months.

## 2022-07-08 NOTE — DISCHARGE SUMMARY
[facility]  [facility], [address] [place] [address]  [phone] [address] Adult  Hospitalist Group     Discharge Summary       PATIENT ID: Mckenzie Butterfield  MRN: [id number]   YOB: 1950    DATE OF ADMISSION: 7/7/2022 10:27 AM    DATE OF DISCHARGE: 07/08/22   PRIMARY CARE PROVIDER: Clifford Zuñiga MD     DISCHARGING PROVIDER: Zhang Napier MD      CONSULTATIONS: IP CONSULT TO CARDIOLOGY  IP CONSULT TO CARDIOLOGY  IP CONSULT TO CARDIOLOGY    PROCEDURES/SURGERIES: * No surgery found *    ADMITTING [address] [place] [address] COURSE:   Dyspnea  -may have been related to dehydration  -cardiology evaluated, doesn't feel this is from [address]  -diuretic discontinued     BERONICA: was likely from dehydration. Now improved.      Atrial fibrillation: HR slightly elevated. Continue home beta-blocker. Continue home Xarelto     COPD: cont home meds     Concern for urinary retention: No current evidence of any urinary retention. Continue to monitor post voids     Hypertension: Continue home beta-blocker and hctz     Hyperlipidemia: Continue home Zetia and statin     Anemia: Continue home iron.     PAD (peripheral artery disease) ([facility] [address] (1/28/2014): Continue home Xarelto and statin.     AAA: Follow-up outpatient.     Depression: Continue home Cymbalta. PENDING TEST RESULTS:   At the time of discharge the following test results are still pending: none    FOLLOW UP APPOINTMENTS:    Follow-up Information     Follow up With Specialties Details Why Contact Info    Clifford Zuñiga MD Family Medicine In 1 week Hospital discharge follow up [address] [facility] [id number] [phone]               DIET: cardiac    ACTIVITY: as tolerated       DISCHARGE MEDICATIONS:  Current Discharge Medication List      CONTINUE these medications which have NOT CHANGED    Details   ferrous sulfate 325 mg (65 mg iron) tablet Take 325 mg by mouth daily.       ezetimibe (Zetia) 10 mg tablet Take 10 mg by mouth daily. fluticasone furoate-vilanteroL (Breo Ellipta) 200-25 mcg/dose inhaler Take 1 Puff by inhalation daily. tiotropium bromide (Spiriva Respimat) 2.5 mcg/actuation inhaler Take 2 Puffs by inhalation daily. DULoxetine (CYMBALTA) 60 mg capsule Take 60 mg by mouth daily. potassium chloride SR (KLOR-CON 10) 10 mEq tablet Take 20 mEq by mouth daily. TURMERIC PO Take 1 Capsule by mouth daily. berberine/herbal complex no.18 (BERBERINE-HERBAL COMB NO.18 PO) Take 2 Capsules by mouth daily. rivaroxaban (XARELTO) 20 mg tab tablet Take 20 mg by mouth daily (with dinner). chlorthalidone (HYGROTEN) 25 mg tablet Take 12.5 mg by mouth daily. rosuvastatin (CRESTOR) 40 mg tablet Take 40 mg by mouth nightly. ascorbic acid, vitamin C, (VITAMIN C) 500 mg tablet Take 500 mg by mouth daily. vit A/vit C/vit E/zinc/copper (ICAPS AREDS PO) Take 2 Tabs by mouth daily. cholecalciferol (VITAMIN D3) 1,000 unit cap Take 2,000 Units by mouth daily. albuterol (PROVENTIL HFA) 90 mcg/actuation inhaler Take 1 Puff by inhalation every four (4) hours as needed for Wheezing. Qty: 1 Inhaler, Refills: 0      metoprolol succinate (TOPROL-XL) 50 mg XL tablet Take 50 mg by mouth daily. STOP taking these medications       bumetanide (BUMEX) 2 mg tablet Comments:   Reason for Stopping:                 NOTIFY YOUR PHYSICIAN FOR ANY OF THE FOLLOWING:   Fever over 101 degrees for 24 hours. Chest pain, shortness of breath, fever, chills, nausea, vomiting, diarrhea, change in mentation, falling, weakness, bleeding. Severe pain or pain not relieved by medications. Or, any other signs or symptoms that you may have questions about.     DISPOSITION:   x Home With:   OT  PT  HH  RN       Long term SNF/Inpatient Rehab    Independent/assisted living    Hospice    Other:         PHYSICAL EXAMINATION AT DISCHARGE:  General:          Alert, cooperative, no distress, appears stated age. HEENT:           Atraumatic, anicteric sclerae, pink conjunctivae                          No oral ulcers, mucosa moist, throat clear, dentition fair  Neck:               Supple, symmetrical  Lungs:             Clear to auscultation bilaterally. No Wheezing or Rhonchi. No rales. Heart:              Regular  rhythm,  No  murmur   No edema  Abdomen:        Soft, non-tender. Not distended. Bowel sounds normal  Extremities:     No cyanosis. No clubbing,                            Skin turgor normal, Capillary refill normal  Skin:                Not pale. Not Jaundiced  No rashes   Psych:             Not anxious or agitated.   Neurologic:      Alert, moves all extremities, answers questions appropriately and responds to commands       CHRONIC MEDICAL DIAGNOSES:  Problem List as of 7/8/2022 Date Reviewed: 1/28/2014          Codes Class Noted - Resolved    Acute on chronic heart failure ([facility] ICD-10-CM: I50.9  ICD-9-CM: 428.0  7/7/2022 - Present        Acute hepatitis ICD-10-CM: B17.9  ICD-9-CM: 570  1/28/2014 - Present        Hepatic encephalopathy ([facility] ICD-10-CM: K72.90  ICD-9-CM: 572.2  1/28/2014 - Present        Coronary atherosclerosis of native coronary artery (Chronic) ICD-10-CM: I25.10  ICD-9-CM: 414.01  1/28/2014 - Present        PAD (peripheral artery disease) (HCC) (Chronic) ICD-10-CM: I73.9  ICD-9-CM: 443.9  1/28/2014 - Present        Depressive disorder, not elsewhere classified (Chronic) ICD-10-CM: F32.89  ICD-9-CM: 719  1/28/2014 - Present        Unspecified essential hypertension (Chronic) ICD-10-CM: I10  ICD-9-CM: 401.9  1/28/2014 - Present              Greater than 30 minutes were spent with the patient on counseling and coordination of care    Signed:   Roberto Charles MD  7/8/2022  12:50 PM

## 2022-07-08 NOTE — PROGRESS NOTES
Antonio Roa MD, [address] [address]  [address]  [phone]      IMPRESSION and RECOMMENDATIONS     1. CHF:  I suspect he was erroneously Dx'ed with CHF in [address] and started on diuretic resulting in intravascular volume depletion. He has no evidence of CHF on exam.  Seems stable for discharge from a cardiac standpoint off lasix. Echo at OSH unremarkable. Report pasted at end of this note. No mechanism for CHF. 2.  AF:  Xarelto, toprol. Will see prn.     I have discussed this plan with the patient. He appears to understand this plan and wishes to proceed ahead. Subjective:       No complaints. Objective:   Patient Vitals for the past 16 hrs:   BP Temp Pulse Resp SpO2   07/08/22 0753 109/69 97.4 °F (36.3 °C) 84 18 96 %   07/08/22 0732 -- -- -- -- 93 %   07/08/22 0700 -- -- (!) 8 -- --   07/08/22 0400 116/66 98 °F (36.7 °C) 82 20 94 %   07/08/22 0000 -- -- -- -- 93 %   07/07/22 2301 120/65 98 °F (36.7 °C) 88 20 93 %   07/07/22 2247 -- -- -- -- 94 %   07/07/22 2159 -- -- -- -- 94 %   07/07/22 2149 115/65 98.2 °F (36.8 °C) 90 20 94 %   07/07/22 2135 122/66 98.5 °F (36.9 °C) 94 20 94 %   07/07/22 2000 (!) 106/45 -- 92 -- --       HEENT Exam:     WNL         Lung Exam:     The patient is not dyspneic. Breath sounds are heard equally in all lung fields. There are no rales or rubs heard on auscultation. Coarse BS in inf RLL field. Heart Exam:     The rhythm is irregularly irregular. The PMI is in the 5th intercostal space of the MCL. Apical impulse is normal. S1 is regular. S2 is physiologic. There is no S3, S4 gallop, murmur, click, or rub. Abdomen Exam:     Benign. Extremities Exam:     No cyanosis, clubbing, edema. Distal pulses intact.            Lab Results   Component Value Date/Time    Glucose 102 (H) 07/08/2022 05:33 AM    Sodium 141 07/08/2022 05:33 AM    Potassium 3.3 (L) 07/08/2022 05:33 AM    Chloride 104 07/08/2022 05:33 AM    CO2 29 07/08/2022 05:33 AM    BUN 19 07/08/2022 05:33 AM    Creatinine 1.03 07/08/2022 05:33 AM    Calcium 8.4 (L) 07/08/2022 05:33 AM     Recent Labs     07/08/22  0533 07/07/22  1053   WBC 6.9 9.9   HGB 9.1* 11.6*   HCT 32.1* 38.6    457*     Recent Labs     07/08/22 0533 07/07/22  1053   ALT 20 25   AP 92 112   TBILI 0.5 0.4   TP 6.5 7.7   ALB 3.1* 3.6   GLOB 3.4 4.1*     No results for input(s): INR, PTP, APTT, INREXT in the last 72 hours. No results for input(s): CPK, CKMB, TNIPOC in the last 72 hours. No lab exists for component: TROPONINI, ITNL  No results for input(s): TROIQ in the last 72 hours. No results found for: CHOL, CHOLX, CHLST, CHOLV, HDL, HDLP, LDL, LDLC, DLDLP, TGLX, TRIGL, TRIGP, CHHD, CHHDX    Echo (7/1/22): · This is a difficult study with poor acoustic windows   · Normal LV systolic function, EF 55 - 60%, with moderate diastolic   dysfunction. · Mild left atrial enlargement   · Mild aortic stenosis peak gradient 25 mean gradient 13   · Trace MR and trace TR   · Right heart is grossly normal in size and function. Left Ventricle   Normal LV systolic function, ejection fraction 55 - 60% with moderate diastolic dysfunction. Normal chamber size and wall thickness. Right Ventricle   Normal right ventricular chamber size and systolic function. Left Atrium   Left atrium is mildly dilated. Right Atrium   Right atrium is normal.     IVC/SVC   The IVC is normal in size with normal respiratory variation, estimated CVP is 0-5 mmHg. Mitral Valve   There is thickening of the mitral leaflets. The anterior leaflet is mildly thickened with mildly decreased leaflet mobility. The posterior leaflet is mildly thickened with mildly decreased leaflet mobility. There is trivial mitral regurgitation. There is no significant mitral stenosis. Tricuspid Valve   Tricuspid valve not well visualized.  Trace tricuspid regurgitation.    The pulmonary artery pressure cannot be accurately estimated. Aortic Valve   Aortic valve not well visualized. There is no significant aortic insufficiency. Mild aortic stenosis. Pulmonic Valve   The pulmonic valve is normal in structure and function. The pulmonary artery appears normal.     Ascending Aorta   Normal aortic root, size and contour. Pericardium   Pericardial fat pad is present.

## 2022-07-08 NOTE — PROGRESS NOTES
TRANSFER - OUT REPORT:    Verbal report given to Raghav Stewart RN(name) on Nichole Guzmán  being transferred to 332(unit) for routine progression of care       Report consisted of patients Situation, Background, Assessment and   Recommendations(SBAR). Information from the following report(s) SBAR, Kardex and ED Summary was reviewed with the receiving nurse. Lines:   Peripheral IV 07/07/22 Right Antecubital (Active)   Site Assessment Clean, dry, & intact 07/07/22 2215   Phlebitis Assessment 0 07/07/22 2215   Infiltration Assessment 0 07/07/22 2215   Dressing Status Clean, dry, & intact 07/07/22 2215   Dressing Type Tape;Transparent 07/07/22 2215   Hub Color/Line Status Pink;Patent; Flushed;Capped 07/07/22 2215   Action Taken Blood drawn 07/07/22 1053   Alcohol Cap Used Yes 07/07/22 2215        Opportunity for questions and clarification was provided.       Patient transported with:   Registered Nurse

## 2022-07-08 NOTE — PROGRESS NOTES
Spoke with Dr Afua Malagon: Per pt he went on a fishing trip last week on the [place]. On Friday 7/1/22 he woke up that morning very SOB, and stood up to get OOB and became dizzy and light headed several times, then fell and scraped his R arm on bedroom furniture before being taken to a hospital. Refused admission and has been SOB since last Friday. Had 9 beat run of VT right after placed on Tele. Asymptomatic. K replaced in ED. Mg level 1.9, see new order for replacement.

## 2022-07-08 NOTE — PROGRESS NOTES
Received from ED/ code black via w/c and pt ambulates in room. In NAD. IV SL. VSS and placed on Tele #332. See admission assessment and interventions. Cardio consult MD in to see pt.

## 2022-07-08 NOTE — PROGRESS NOTES
Reason for Admission:  Acute on chronic hear failure                     RUR Score:    10%                 Plan for utilizing home health:      none    PCP: First and Last name:  Tanna Hoff MD     Name of Practice:    Are you a current patient: Yes/No: yes   Approximate date of last visit: June 2022   Can you participate in a virtual visit with your PCP: yes                    Current Advanced Directive/Advance Care Plan: Full Code  Verified on file  Healthcare Decision Maker:   Click here to complete [address] including selection of the Healthcare Decision Maker Relationship (ie \"Primary\")                             Transition of Care Plan:                    Met with pt and his wife. He is [place] and his wife spoke on his behalf. Pt lives with his wife in a 2 story house. His BR is on the ground floor; there are 14 stairs to get to the shower on the second floor. His wife stated he has to stop & rest.  There are 4 entry steps with handrails. Pt is independent with ADL's, drives and uses nor owns any DME. Medications are from [place] [facility]. 1. Pt's wife to transport him home at d/c today  2. Cardiology following  3. Pt to f/u OP with providers  4. CM following    Care Management Interventions  PCP Verified by CM: Yes  Mode of Transport at Discharge: Other (see comment)  Physical Therapy Consult: Yes  Occupational Therapy Consult: Yes  Support Systems: Spouse/Significant Other  Confirm Follow Up Transport: Family  Discharge Location  Patient Expects to be Discharged to[de-identified] Home with family assistance  LINETTE Escalera

## 2022-07-08 NOTE — CONSULTS
Sarah Alston MD, [address] [place] [address]  Spike Teixeira   [phone]    Date of  Admission: 7/7/2022 10:27 AM         IMPRESSION and RECOMMENDATIONS     1. CHF:  Does not appear very volume overloaded. Agree with echo. I don't feel strongly about diuresis. 2.  AF:  Xarelto, toprol. I have discussed this plan with the patient. He appears to understand this plan and wishes to proceed ahead. Problem List  Date Reviewed: 1/28/2014          Codes Class Noted    Acute on chronic heart failure ([facility] ICD-10-CM: I50.9  ICD-9-CM: 428.0  7/7/2022        Acute hepatitis ICD-10-CM: B17.9  ICD-9-CM: 570  1/28/2014        Hepatic encephalopathy ([facility] ICD-10-CM: K72.90  ICD-9-CM: 572.2  1/28/2014        Coronary atherosclerosis of native coronary artery (Chronic) ICD-10-CM: I25.10  ICD-9-CM: 414.01  1/28/2014        PAD (peripheral artery disease) (HCC) (Chronic) ICD-10-CM: I73.9  ICD-9-CM: 443.9  1/28/2014        Depressive disorder, not elsewhere classified (Chronic) ICD-10-CM: F32.89  ICD-9-CM: 569  1/28/2014        Unspecified essential hypertension (Chronic) ICD-10-CM: I10  ICD-9-CM: 401.9  1/28/2014              History of Present Illness:     Eulalio Batista is a 67 y.o. male with the above problem list who was admitted for Acute on chronic heart failure ([facility] [I50.9]. Mr. Trav Sahu is a 67 y.o.  male atrial fibrillation on Xarelto, coronary artery disease, peripheral artery disease, OPD, AAA, hypertension, hyperlipidemia, macular degeneration who is admitted with dyspnea. Mr. Trav Sahu is accompanied by by his wife is at bedside and helps provide history. Wife states that patient visited the ER last Friday and he was advised to be admitted for heart failure however he declined. She states that yesterday evening he told her he has been unable to urinate for the past 2 days.   He stated to the emergency room that he was having chest pain with exertion; however he denies any during our encounter. He denies any syncope. He states he is unable to feel his heart palpitations. He denies any increased lower extremity swelling. He denies any abdominal pain nausea vomiting. He denies chest pain/discomfort, shortness of breath, dyspnea on exertion, orthopnea, paroxysmal noctural dyspnea, lower extremity edema, palpitations, syncope, or near-syncope.     Current Facility-Administered Medications   Medication Dose Route Frequency    sodium chloride (NS) flush 5-40 mL  5-40 mL IntraVENous Q8H    sodium chloride (NS) flush 5-40 mL  5-40 mL IntraVENous PRN    polyethylene glycol (MIRALAX) packet 17 g  17 g Oral DAILY PRN    ondansetron (ZOFRAN ODT) tablet 4 mg  4 mg Oral Q8H PRN    Or    ondansetron (ZOFRAN) injection 4 mg  4 mg IntraVENous Q6H PRN    [START ON 7/8/2022] DULoxetine (CYMBALTA) capsule 60 mg  60 mg Oral DAILY    [START ON 7/8/2022] ezetimibe (ZETIA) tablet 10 mg  10 mg Oral DAILY    [START ON 7/8/2022] ferrous sulfate tablet 325 mg  325 mg Oral DAILY WITH BREAKFAST    arformoterol 15 mcg/budesonide 0.5 mg neb solution   Nebulization BID RT    [START ON 7/8/2022] metoprolol succinate (TOPROL-XL) XL tablet 50 mg  50 mg Oral DAILY    rivaroxaban (XARELTO) tablet 20 mg  20 mg Oral DAILY WITH DINNER    rosuvastatin (CRESTOR) tablet 40 mg  40 mg Oral QHS    [START ON 7/8/2022] ipratropium (ATROVENT) 0.02 % nebulizer solution 0.5 mg  0.5 mg Nebulization Q6H RT      No Known Allergies   Family History   Problem Relation Age of Onset    Hypertension Other       Social History     Socioeconomic History    Marital status:      Spouse name: Not on file    Number of children: Not on file    Years of education: Not on file    Highest education level: Not on file   Occupational History    Not on file   Tobacco Use    Smoking status: Current Every Day Smoker     Packs/day: 1.00     Years: 50.00     Pack years: 50.00    Smokeless tobacco: Never Used   Substance and Sexual Activity    Alcohol use: Yes     Alcohol/week: 12.0 standard drinks     Types: 12 Cans of beer per week    Drug use: No    Sexual activity: Not on file   Other Topics Concern    Not on file   Social History Narrative    Not on file     Social Determinants of Health     Financial Resource Strain:     Difficulty of Paying Living Expenses: Not on file   Food Insecurity:     Worried About Running Out of Food in the Last Year: Not on file    Becki of Food in the Last Year: Not on file   Transportation Needs:     Lack of Transportation (Medical): Not on file    Lack of Transportation (Non-Medical):  Not on file   Physical Activity:     Days of Exercise per Week: Not on file    Minutes of Exercise per Session: Not on file   Stress:     Feeling of Stress : Not on file   Social Connections:     Frequency of Communication with Friends and Family: Not on file    Frequency of Social Gatherings with Friends and Family: Not on file    Attends Anabaptism Services: Not on file    Active Member of [address] [place] [address] or Organizations: Not on file    Attends Club or Organization Meetings: Not on file    Marital Status: Not on file   Intimate Partner Violence:     Fear of Current or Ex-Partner: Not on file    Emotionally Abused: Not on file    Physically Abused: Not on file    Sexually Abused: Not on file   Housing Stability:     Unable to Pay for Housing in the Last Year: Not on file    Number of [place] in the Last Year: Not on file    Unstable Housing in the Last Year: Not on file       Physical Exam:     Patient Vitals for the past 16 hrs:   BP Temp Pulse Resp SpO2 Height Weight   07/07/22 2135 122/66 98.5 °F (36.9 °C) 94 20 94 % -- --   07/07/22 2000 (!) 106/45 -- 92 -- -- -- --   07/07/22 1800 121/62 -- (!) 119 -- 92 % -- --   07/07/22 1600 124/69 97.8 °F (36.6 °C) (!) 103 25 95 % -- --   07/07/22 1549 -- -- (!) 113 -- -- -- --   07/07/22 1442 (!) 123/53 97.6 °F (36.4 °C) (!) 109 19 92 % -- --   07/07/22 1025 (!) 118/56 98 °F (36.7 °C) 92 19 97 % 5' 10\" (1.778 m) 93 kg (205 lb)       HEENT Exam:     Normocephalic, atraumatic. EOMI. Oropharynx negative. Neck supple. No lymphadenopathy. Lung Exam:     The patient is not dyspneic. There is no cough. Breath sounds are heard equally in all lung fields. There are no wheezes, rales, rhonchi, or rubs heard on auscultation. Heart Exam:     The rhythm is irregularly irregular. The PMI is in the 5th intercostal space of the MCL. Apical impulse is normal. S1 is regular. S2 is physiologic. There is no S3, S4 gallop, murmur, click, or rub. Abdomen Exam:     Bowel sounds are normoactive. Abdomen soft in all quadrants. No tenderness. No palpable masses. No organomegaly. No hernias noted. No bruits or pulsatile mass. Extremities Exam:     The extremities are atraumatic appearing. There is no clubbing, cyanosis, edema, ulcers, varicose veins, rash, erythemia noted in the extremities. The neurovascular status is grossly intact with normal distal sensation and pulses. Vascular Exam:     The radial, brachial, dorsalis pedis, posterior tibial, are equal and strong bilaterally The carotids are equal bilaterally without bruits. Labs:     Lab Results   Component Value Date/Time    Glucose 156 (H) 07/07/2022 10:53 AM    Sodium 139 07/07/2022 10:53 AM    Potassium 3.2 (L) 07/07/2022 10:53 AM    Chloride 102 07/07/2022 10:53 AM    CO2 27 07/07/2022 10:53 AM    BUN 18 07/07/2022 10:53 AM    Creatinine 1.48 (H) 07/07/2022 10:53 AM    Calcium 9.3 07/07/2022 10:53 AM     Recent Labs     07/07/22  1053   WBC 9.9   HGB 11.6*   HCT 38.6   *     Recent Labs     07/07/22  1053   ALT 25      TBILI 0.4   TP 7.7   ALB 3.6   GLOB 4.1*     No results for input(s): INR, PTP, APTT, INREXT in the last 72 hours. No results for input(s): CPK, CKMB, TNIPOC in the last 72 hours.     No lab exists for component: TROPONINI, ITNL  No results for input(s): TROIQ in the last 72 hours.   No results found for: CHOL, CHOLX, CHLST, CHOLV, HDL, HDLP, LDL, LDLC, DLDLP, TGLX, TRIGL, TRIGP, CHHD, CHHDX    EKG:  AF @ 127, NSSTTW abn

## 2022-07-10 LAB
CALCULATED R AXIS, ECG10: -35 DEGREES
CALCULATED T AXIS, ECG11: 153 DEGREES
DIAGNOSIS, 93000: NORMAL
Q-T INTERVAL, ECG07: 262 MS
QRS DURATION, ECG06: 112 MS
QTC CALCULATION (BEZET), ECG08: 380 MS
VENTRICULAR RATE, ECG03: 127 BPM

## 2022-07-11 ENCOUNTER — PATIENT OUTREACH (OUTPATIENT)
Dept: CASE MANAGEMENT | Age: 72
End: 2022-07-11

## 2022-07-11 NOTE — PROGRESS NOTES
Pt.wife declined Care Management, CYDNEY episode closed. -110[facility] Transitions Initial Call    Call within 2 business days of discharge: Yes     Patient: Cullen Erazo Patient : 1950 MRN: [id number]    Last Discharge  [address]       Complaint Diagnosis Description Type Department Provider    22 Urinary Retention Acute on chronic congestive heart failure, unspecified heart failure type ([facility] . .. ED to Hosp-Admission (Discharged) (ADMIT) Ashley Barnett MD; Lalo Avina, ... Was this an external facility discharge?  No     Challenges to be reviewed by the provider     Component      Latest Ref Rng & Units 2022          10:53 AM   NT pro-BNP      <125 PG/ML 3,584 (H)     Component      Latest Ref Rng & Units 2022           5:33 AM   Potassium      3.5 - 5.1 mmol/L 3.3 (L)          Method of communication with provider :    STOP taking:  bumetanide 2 mg tablet (BUMEX)

## 2022-07-15 ENCOUNTER — TRANSCRIBE ORDER (OUTPATIENT)
Dept: SCHEDULING | Age: 72
End: 2022-07-15

## 2022-07-15 DIAGNOSIS — R93.89 ABNORMAL CHEST CT: Primary | ICD-10-CM

## 2022-07-18 ENCOUNTER — TRANSCRIBE ORDER (OUTPATIENT)
Dept: SCHEDULING | Age: 72
End: 2022-07-18

## 2022-07-18 DIAGNOSIS — R93.89 ABNORMAL CHEST CT: Primary | ICD-10-CM

## 2022-07-19 ENCOUNTER — TRANSCRIBE ORDER (OUTPATIENT)
Dept: SCHEDULING | Age: 72
End: 2022-07-19

## 2022-07-21 ENCOUNTER — HOSPITAL ENCOUNTER (INPATIENT)
Age: 72
LOS: 2 days | Discharge: HOME | DRG: 291 | End: 2022-07-23
Attending: EMERGENCY MEDICINE | Admitting: INTERNAL MEDICINE
Payer: MEDICARE

## 2022-07-21 ENCOUNTER — TRANSCRIBE ORDER (OUTPATIENT)
Dept: SCHEDULING | Age: 72
End: 2022-07-21

## 2022-07-21 ENCOUNTER — APPOINTMENT (OUTPATIENT)
Dept: GENERAL RADIOLOGY | Age: 72
DRG: 291 | End: 2022-07-21
Attending: EMERGENCY MEDICINE
Payer: MEDICARE

## 2022-07-21 DIAGNOSIS — J44.1 COPD EXACERBATION (HCC): ICD-10-CM

## 2022-07-21 DIAGNOSIS — I50.9 ACUTE ON CHRONIC CONGESTIVE HEART FAILURE, UNSPECIFIED HEART FAILURE TYPE (HCC): Primary | ICD-10-CM

## 2022-07-21 DIAGNOSIS — R91.1 PULMONARY NODULE: Primary | ICD-10-CM

## 2022-07-21 PROBLEM — J96.01 ACUTE HYPOXEMIC RESPIRATORY FAILURE (HCC): Status: ACTIVE | Noted: 2022-07-21

## 2022-07-21 LAB
ALBUMIN SERPL-MCNC: 3.2 G/DL (ref 3.5–5)
ALBUMIN/GLOB SERPL: 0.8 {RATIO} (ref 1.1–2.2)
ALP SERPL-CCNC: 112 U/L (ref 45–117)
ALT SERPL-CCNC: 20 U/L (ref 12–78)
ANION GAP SERPL CALC-SCNC: 5 MMOL/L (ref 5–15)
AST SERPL-CCNC: 19 U/L (ref 15–37)
BASOPHILS # BLD: 0.1 K/UL (ref 0–0.1)
BASOPHILS NFR BLD: 1 % (ref 0–1)
BILIRUB SERPL-MCNC: 0.4 MG/DL (ref 0.2–1)
BNP SERPL-MCNC: 1430 PG/ML
BUN SERPL-MCNC: 17 MG/DL (ref 6–20)
BUN/CREAT SERPL: 15 (ref 12–20)
CALCIUM SERPL-MCNC: 8.8 MG/DL (ref 8.5–10.1)
CHLORIDE SERPL-SCNC: 105 MMOL/L (ref 97–108)
CO2 SERPL-SCNC: 28 MMOL/L (ref 21–32)
COMMENT, HOLDF: NORMAL
CREAT SERPL-MCNC: 1.12 MG/DL (ref 0.7–1.3)
DIFFERENTIAL METHOD BLD: ABNORMAL
EOSINOPHIL # BLD: 0.3 K/UL (ref 0–0.4)
EOSINOPHIL NFR BLD: 4 % (ref 0–7)
ERYTHROCYTE [DISTWIDTH] IN BLOOD BY AUTOMATED COUNT: 21.7 % (ref 11.5–14.5)
GLOBULIN SER CALC-MCNC: 4 G/DL (ref 2–4)
GLUCOSE SERPL-MCNC: 165 MG/DL (ref 65–100)
HCT VFR BLD AUTO: 37.2 % (ref 36.6–50.3)
HGB BLD-MCNC: 10.9 G/DL (ref 12.1–17)
IMM GRANULOCYTES # BLD AUTO: 0.1 K/UL (ref 0–0.04)
IMM GRANULOCYTES NFR BLD AUTO: 1 % (ref 0–0.5)
LIPASE SERPL-CCNC: 94 U/L (ref 73–393)
LYMPHOCYTES # BLD: 1 K/UL (ref 0.8–3.5)
LYMPHOCYTES NFR BLD: 11 % (ref 12–49)
MAGNESIUM SERPL-MCNC: 2 MG/DL (ref 1.6–2.4)
MCH RBC QN AUTO: 23.4 PG (ref 26–34)
MCHC RBC AUTO-ENTMCNC: 29.3 G/DL (ref 30–36.5)
MCV RBC AUTO: 80 FL (ref 80–99)
MONOCYTES # BLD: 0.7 K/UL (ref 0–1)
MONOCYTES NFR BLD: 8 % (ref 5–13)
NEUTS SEG # BLD: 6.5 K/UL (ref 1.8–8)
NEUTS SEG NFR BLD: 75 % (ref 32–75)
NRBC # BLD: 0.05 K/UL (ref 0–0.01)
NRBC BLD-RTO: 0.6 PER 100 WBC
PLATELET # BLD AUTO: 353 K/UL (ref 150–400)
PMV BLD AUTO: 9.5 FL (ref 8.9–12.9)
POTASSIUM SERPL-SCNC: 3.8 MMOL/L (ref 3.5–5.1)
PROT SERPL-MCNC: 7.2 G/DL (ref 6.4–8.2)
RBC # BLD AUTO: 4.65 M/UL (ref 4.1–5.7)
RBC MORPH BLD: ABNORMAL
RBC MORPH BLD: ABNORMAL
SAMPLES BEING HELD,HOLD: NORMAL
SODIUM SERPL-SCNC: 138 MMOL/L (ref 136–145)
TROPONIN-HIGH SENSITIVITY: 72 NG/L (ref 0–76)
WBC # BLD AUTO: 8.7 K/UL (ref 4.1–11.1)

## 2022-07-21 PROCEDURE — 83735 ASSAY OF MAGNESIUM: CPT

## 2022-07-21 PROCEDURE — 71046 X-RAY EXAM CHEST 2 VIEWS: CPT

## 2022-07-21 PROCEDURE — 83880 ASSAY OF NATRIURETIC PEPTIDE: CPT

## 2022-07-21 PROCEDURE — 99285 EMERGENCY DEPT VISIT HI MDM: CPT

## 2022-07-21 PROCEDURE — 85025 COMPLETE CBC W/AUTO DIFF WBC: CPT

## 2022-07-21 PROCEDURE — 74011250636 HC RX REV CODE- 250/636: Performed by: INTERNAL MEDICINE

## 2022-07-21 PROCEDURE — 93005 ELECTROCARDIOGRAM TRACING: CPT

## 2022-07-21 PROCEDURE — 36415 COLL VENOUS BLD VENIPUNCTURE: CPT

## 2022-07-21 PROCEDURE — 94640 AIRWAY INHALATION TREATMENT: CPT

## 2022-07-21 PROCEDURE — 83690 ASSAY OF LIPASE: CPT

## 2022-07-21 PROCEDURE — 74011250636 HC RX REV CODE- 250/636: Performed by: EMERGENCY MEDICINE

## 2022-07-21 PROCEDURE — 96375 TX/PRO/DX INJ NEW DRUG ADDON: CPT

## 2022-07-21 PROCEDURE — 80053 COMPREHEN METABOLIC PANEL: CPT

## 2022-07-21 PROCEDURE — 96374 THER/PROPH/DIAG INJ IV PUSH: CPT

## 2022-07-21 PROCEDURE — 65270000029 HC RM PRIVATE

## 2022-07-21 PROCEDURE — 84484 ASSAY OF TROPONIN QUANT: CPT

## 2022-07-21 PROCEDURE — 74011000250 HC RX REV CODE- 250: Performed by: INTERNAL MEDICINE

## 2022-07-21 RX ORDER — ONDANSETRON 2 MG/ML
4 INJECTION INTRAMUSCULAR; INTRAVENOUS
Status: DISCONTINUED | OUTPATIENT
Start: 2022-07-21 | End: 2022-07-23 | Stop reason: HOSPADM

## 2022-07-21 RX ORDER — ONDANSETRON 4 MG/1
4 TABLET, ORALLY DISINTEGRATING ORAL
Status: DISCONTINUED | OUTPATIENT
Start: 2022-07-21 | End: 2022-07-23 | Stop reason: HOSPADM

## 2022-07-21 RX ORDER — IPRATROPIUM BROMIDE AND ALBUTEROL SULFATE 2.5; .5 MG/3ML; MG/3ML
3 SOLUTION RESPIRATORY (INHALATION)
Status: DISCONTINUED | OUTPATIENT
Start: 2022-07-21 | End: 2022-07-23 | Stop reason: HOSPADM

## 2022-07-21 RX ORDER — FUROSEMIDE 10 MG/ML
60 INJECTION INTRAMUSCULAR; INTRAVENOUS ONCE
Status: COMPLETED | OUTPATIENT
Start: 2022-07-21 | End: 2022-07-21

## 2022-07-21 RX ORDER — ALBUTEROL SULFATE 0.83 MG/ML
5 SOLUTION RESPIRATORY (INHALATION)
Status: ACTIVE | OUTPATIENT
Start: 2022-07-21 | End: 2022-07-22

## 2022-07-21 RX ORDER — SODIUM CHLORIDE 0.9 % (FLUSH) 0.9 %
5-40 SYRINGE (ML) INJECTION EVERY 8 HOURS
Status: DISCONTINUED | OUTPATIENT
Start: 2022-07-21 | End: 2022-07-23 | Stop reason: HOSPADM

## 2022-07-21 RX ORDER — FUROSEMIDE 10 MG/ML
40 INJECTION INTRAMUSCULAR; INTRAVENOUS DAILY
Status: DISCONTINUED | OUTPATIENT
Start: 2022-07-22 | End: 2022-07-22

## 2022-07-21 RX ORDER — SODIUM CHLORIDE 0.9 % (FLUSH) 0.9 %
5-40 SYRINGE (ML) INJECTION AS NEEDED
Status: DISCONTINUED | OUTPATIENT
Start: 2022-07-21 | End: 2022-07-23 | Stop reason: HOSPADM

## 2022-07-21 RX ORDER — POLYETHYLENE GLYCOL 3350 17 G/17G
17 POWDER, FOR SOLUTION ORAL DAILY PRN
Status: DISCONTINUED | OUTPATIENT
Start: 2022-07-21 | End: 2022-07-23 | Stop reason: HOSPADM

## 2022-07-21 RX ADMIN — METHYLPREDNISOLONE SODIUM SUCCINATE 125 MG: 125 INJECTION, POWDER, FOR SOLUTION INTRAMUSCULAR; INTRAVENOUS at 15:43

## 2022-07-21 RX ADMIN — CEFTRIAXONE 1 G: 1 INJECTION, POWDER, FOR SOLUTION INTRAMUSCULAR; INTRAVENOUS at 19:28

## 2022-07-21 RX ADMIN — IPRATROPIUM BROMIDE AND ALBUTEROL SULFATE 3 ML: .5; 3 SOLUTION RESPIRATORY (INHALATION) at 20:05

## 2022-07-21 RX ADMIN — AZITHROMYCIN MONOHYDRATE 500 MG: 500 INJECTION, POWDER, LYOPHILIZED, FOR SOLUTION INTRAVENOUS at 19:28

## 2022-07-21 RX ADMIN — METHYLPREDNISOLONE SODIUM SUCCINATE 40 MG: 40 INJECTION, POWDER, FOR SOLUTION INTRAMUSCULAR; INTRAVENOUS at 22:18

## 2022-07-21 RX ADMIN — FUROSEMIDE 60 MG: 10 INJECTION, SOLUTION INTRAMUSCULAR; INTRAVENOUS at 15:43

## 2022-07-21 NOTE — ED TRIAGE NOTES
Pt arrives to ED for low oxygen for several days with worsening cough. 89% on RA in triage. Hx COPD and CHF    Pt states that he has not been urinating as much as should. Wife states that was seen at another facility for CHF and was told he needed a PET scan after finding a lung nodule.

## 2022-07-21 NOTE — H&P
[facility]  [address], [place] [address]  [phone]    Admission History and Physical      NAME:  Bret Krueger   :   1950   MRN:  [id number]     PCP:  Becky Wu MD     Date of service:  2022         Subjective:     CHIEF COMPLAINT: worsening shortness of breath    HISTORY OF PRESENT ILLNESS:     Mr. Humaira Nelson is a 67 y.o.  male with past medical history of coronary artery disease, paroxysmal atrial fibrillation, peripheral vascular disease, hyperlipidemia, hypertension, COPD, current active cigarette smoking, hard of hearing, abdominal aortic aneurysm, and depression admitted with diagnosis of COPD exacerbation and acute on chronic congestive heart failure with preserved left ventricular ejection fraction. Mr. Humaira Nelson presented to the Emergency Department today complaining of shortness of breath. He states he cannot even move from bed to the bathroom due to extreme shortness of breath, associated with dry cough. Patient is still smoking. He denies fever, sore throat, chest pain, nausea or vomiting. Chest x-ray showed worsening vascular congestion in comparison to the one he had before. He is not on home oxygen therapy. No leg swelling.     Past Medical History:   Diagnosis Date    AAA (abdominal aortic aneurysm) (HCC)     s/p repair    Anemia     Atrial fibrillation (HCC)     CAD (coronary artery disease)     Carotid stenosis, left     s/p repair    Depression     anger issues    High cholesterol     Hypertension     Liver failure (HCC)     Macular degeneration     Necrotizing pneumonia (HCC)         Past Surgical History:   Procedure Laterality Date    CARDIAC SURG PROCEDURE UNLIST      HX CAROTID ENDARTERECTOMY      HX HEENT      cataract    HX OTHER SURGICAL      Aneurysm repair    VASCULAR SURGERY PROCEDURE UNLIST         Social History     Tobacco Use    Smoking status: Every Day     Packs/day: 1.00     Years: 50.00 Pack years: 50.00     Types: Cigarettes    Smokeless tobacco: Never   Substance Use Topics    Alcohol use: Yes     Alcohol/week: 12.0 standard drinks     Types: 12 Cans of beer per week        Family History   Problem Relation Age of Onset    Hypertension Other         No Known Allergies     Prior to Admission medications    Medication Sig Start Date End Date Taking? Authorizing Provider   ferrous sulfate 325 mg (65 mg iron) tablet Take 325 mg by mouth daily. Provider, Historical   ezetimibe (Zetia) 10 mg tablet Take 10 mg by mouth daily. Provider, Historical   fluticasone furoate-vilanteroL (Breo Ellipta) 200-25 mcg/dose inhaler Take 1 Puff by inhalation daily. Provider, Historical   tiotropium bromide (Spiriva Respimat) 2.5 mcg/actuation inhaler Take 2 Puffs by inhalation daily. Provider, Historical   DULoxetine (CYMBALTA) 60 mg capsule Take 60 mg by mouth daily. Provider, Historical   potassium chloride SR (KLOR-CON 10) 10 mEq tablet Take 20 mEq by mouth daily. Provider, Historical   TURMERIC PO Take 1 Capsule by mouth daily. Provider, Historical   berberine/herbal complex no.18 (BERBERINE-HERBAL COMB NO.18 PO) Take 2 Capsules by mouth daily. Provider, Historical   rivaroxaban (XARELTO) 20 mg tab tablet Take 20 mg by mouth daily (with dinner). Richy Lea MD   chlorthalidone (HYGROTEN) 25 mg tablet Take 12.5 mg by mouth daily. Richy Lea MD   rosuvastatin (CRESTOR) 40 mg tablet Take 40 mg by mouth nightly. Richy Lea MD   ascorbic acid, vitamin C, (VITAMIN C) 500 mg tablet Take 500 mg by mouth daily. Richy Lea MD   vit A/vit C/vit E/zinc/copper (ICAPS AREDS PO) Take 2 Tabs by mouth daily. Richy Lea MD   cholecalciferol (VITAMIN D3) 1,000 unit cap Take 2,000 Units by mouth daily. Richy Lea MD   albuterol (PROVENTIL HFA) 90 mcg/actuation inhaler Take 1 Puff by inhalation every four (4) hours as needed for Wheezing.  10/4/15   Mynor Edmonds PA   metoprolol succinate (TOPROL-XL) 50 mg XL tablet Take 50 mg by mouth daily. Venu, MD Richy         Review of Systems:  (bold if positive, if negative)    Gen:  fatigue  Eyes:  ENT:  CVS:  Pulm:  Cough, dyspnea,GI:  :  MS:  Skin:  Psych:  Endo:  Hem:  Renal:  Neuro:            Objective:      VITALS:    Vital signs reviewed; most recent are:    Visit Vitals  /84 (BP 1 Location: Right upper arm, BP Patient Position: At rest)   Pulse (!) 108   Temp 97.5 °F (36.4 °C)   Resp 18   Ht 5' 10\" (1.778 m)   Wt 93 kg (205 lb)   SpO2 94%   BMI 29.41 kg/m²     SpO2 Readings from Last 6 Encounters:   07/21/22 94%   07/08/22 95%   08/03/19 92%   10/04/15 93%   03/11/14 94%   01/31/14 94%        No intake or output data in the 24 hours ending 07/21/22 1826         Exam:     Physical Exam:    Gen:  Well-developed, well-nourished, in no acute distress  HEENT:  Pink conjunctivae, PERRL, hearing intact to voice, moist mucous membranes  Neck:  Supple, without masses, thyroid non-tender  Resp:  No accessory muscle use, decreased air entry, scattered wheezes   Card:  No murmurs, normal S1, S2 without thrills, bruits or peripheral edema  Abd:  Soft, non-tender, non-distended, normoactive bowel sounds are present  Lymph:  No cervical  adenopathy  Musc:  No cyanosis or clubbing  Skin:  No rashes or ulcers, skin turgor is good  Neuro:  Cranial nerves are grossly intact, no focal motor weakness, follows commands appropriately  Psych:  Good insight, oriented to person, place and time, alert       Labs:    Recent Labs     07/21/22  1206   WBC 8.7   HGB 10.9*   HCT 37.2        Recent Labs     07/21/22  1206      K 3.8      CO2 28   *   BUN 17   CREA 1.12   CA 8.8   MG 2.0   ALB 3.2*   TBILI 0.4   ALT 20     No results found for: GLUCPOC  No results for input(s): PH, PCO2, PO2, HCO3, FIO2 in the last 72 hours. No results for input(s): INR, INREXT, INREXT in the last 72 hours.     Telemetry reviewed:   normal sinus rhythm       Assessment/Plan:    Active Problems:  Acute on chronic congestive heart failure ([facility] (7/21/2022): Lasix 40 mg IV daily, TTE, daily weight, cardiac diet. COPD exacerbation ([facility] (7/21/2022): Duo nebs, ceftriaxone and azithromycin, methyl prednisone    Acute hypoxemic respiratory failure ([facility] (7/21/2022): Not on home oxygen, currently on 2 L.   Keep saturation 89 to 95%     Resume home medication for chronic comorbid conditions  Previous medical records reviewed     Risk of deterioration: high      Total time spent with patient: 48 895 [address] discussed with: Patient    Discussed:  Care Plan    Prophylaxis:  Lovenox    Probable Disposition:  Home w/Family           ___________________________________________________    Attending Physician: Mine Hamilton MD

## 2022-07-21 NOTE — ED PROVIDER NOTES
Patient is a 59-year-old male with past medical history significant for COPD, congestive heart failure, continued tobacco use who presents emergency department for chest discomfort and shortness of breath. He states he feels he cannot get his breath. Notes that he is not normally on oxygen at home. States has been coughing up mucus but denies green, yellow or hemoptysis. States that he has been diagnosed with congestive heart failure in the past when he felt this way. Oxygen levels have been dropping into the low 80s to mid 80s. Past Medical History:   Diagnosis Date    AAA (abdominal aortic aneurysm) (HCC)     s/p repair    Anemia     Atrial fibrillation (HCC)     CAD (coronary artery disease)     Carotid stenosis, left     s/p repair    Depression     anger issues    High cholesterol     Hypertension     Liver failure (HCC)     Macular degeneration     Necrotizing pneumonia (HCC)        Past Surgical History:   Procedure Laterality Date    CARDIAC SURG PROCEDURE UNLIST      HX CAROTID ENDARTERECTOMY      HX HEENT      cataract    HX OTHER SURGICAL      Aneurysm repair    VASCULAR SURGERY PROCEDURE UNLIST           Family History:   Problem Relation Age of Onset    Hypertension Other        Social History     Socioeconomic History    Marital status:      Spouse name: Not on file    Number of children: Not on file    Years of education: Not on file    Highest education level: Not on file   Occupational History    Not on file   Tobacco Use    Smoking status: Every Day     Packs/day: 1.00     Years: 50.00     Pack years: 50.00     Types: Cigarettes    Smokeless tobacco: Never   Substance and Sexual Activity    Alcohol use:  Yes     Alcohol/week: 12.0 standard drinks     Types: 12 Cans of beer per week    Drug use: No    Sexual activity: Not on file   Other Topics Concern    Not on file   Social History Narrative    Not on file     Social Determinants of Health     Financial Resource Strain: Not on file   Food Insecurity: Not on file   Transportation Needs: Not on file   Physical Activity: Not on file   Stress: Not on file   Social Connections: Not on file   Intimate Partner Violence: Not on file   Housing Stability: Not on file         ALLERGIES: Patient has no known allergies. Review of Systems   Constitutional:  Positive for fatigue. Negative for fever. HENT:  Negative for drooling. Respiratory:  Positive for shortness of breath. Cardiovascular:  Positive for chest pain. Musculoskeletal:  Negative for neck pain. Skin:  Negative for rash. Neurological:  Negative for seizures and syncope. Hematological:  Does not bruise/bleed easily. Psychiatric/Behavioral: Negative. Vitals:    07/21/22 1128   BP: 125/63   Pulse: 74   Resp: 16   Temp: 97.5 °F (36.4 °C)   SpO2: (!) 89%   Weight: 93 kg (205 lb)   Height: 5' 10\" (1.778 m)            Physical Exam  Vitals and nursing note reviewed. Constitutional:       Appearance: He is obese. He is ill-appearing. He is not toxic-appearing or diaphoretic. HENT:      Head: Normocephalic and atraumatic. Neck:      Trachea: No tracheal deviation. Cardiovascular:      Rate and Rhythm: Normal rate. Pulmonary:      Breath sounds: Decreased breath sounds, wheezing and rales present. Chest:      Chest wall: No deformity. Musculoskeletal:      Cervical back: Neck supple. Right lower leg: No tenderness. Edema present. Left lower leg: No tenderness. Edema present. Skin:     General: Skin is warm and dry. Neurological:      Mental Status: He is alert and oriented to person, place, and time.    Psychiatric:         Mood and Affect: Mood normal.         Behavior: Behavior normal.        MDM     Amount and/or Complexity of Data Reviewed  Clinical lab tests: reviewed  Decide to obtain previous medical records or to obtain history from someone other than the patient: yes      ED Course as of 07/21/22 1712   Thu Jul 21, 2022   1220 EKG obtained 1152 showing sinus rhythm, rate 75, nonspecific T wave flattening inferior leads, compared to prior EKG patient now in sinus mechanism [JE]      ED Course User Index  [JE] Modesto Russo MD       Procedures        Perfect Serve Consult for Admission  3:14 PM    ED Room Number: CW/CW  Patient Name and age:  Ailyn Ortiz 67 y.o.  male  Working Diagnosis:   1. Acute on chronic congestive heart failure, unspecified heart failure type ([address]    2. COPD exacerbation ([facility]        COVID-19 Suspicion:  no  Sepsis present:  no  Reassessment needed: no  Code Status:  Full Code  Readmission: yes  Isolation Requirements:  no  Recommended Level of Care:  telemetry  Department:[facility] ED - [phone]  Other: Patient is a 43-year-old male with past medical history significant for AAA status postrepair, COPD continue to smoke although trying to stop, congestive heart failure who presents emergency department with O2 requirement while not on oxygen at home (sats into the low to mid 80s) and apparent congestive heart failure exacerbation. CXR shows pulm edema

## 2022-07-22 LAB
ANION GAP SERPL CALC-SCNC: 8 MMOL/L (ref 5–15)
ATRIAL RATE: 75 BPM
BUN SERPL-MCNC: 18 MG/DL (ref 6–20)
BUN/CREAT SERPL: 17 (ref 12–20)
CALCIUM SERPL-MCNC: 8.2 MG/DL (ref 8.5–10.1)
CALCULATED P AXIS, ECG09: 63 DEGREES
CALCULATED R AXIS, ECG10: -26 DEGREES
CALCULATED T AXIS, ECG11: 10 DEGREES
CHLORIDE SERPL-SCNC: 105 MMOL/L (ref 97–108)
CO2 SERPL-SCNC: 28 MMOL/L (ref 21–32)
CREAT SERPL-MCNC: 1.04 MG/DL (ref 0.7–1.3)
DIAGNOSIS, 93000: NORMAL
ERYTHROCYTE [DISTWIDTH] IN BLOOD BY AUTOMATED COUNT: 21.8 % (ref 11.5–14.5)
GLUCOSE SERPL-MCNC: 158 MG/DL (ref 65–100)
HCT VFR BLD AUTO: 33.5 % (ref 36.6–50.3)
HGB BLD-MCNC: 9.8 G/DL (ref 12.1–17)
MAGNESIUM SERPL-MCNC: 2 MG/DL (ref 1.6–2.4)
MAGNESIUM SERPL-MCNC: 2.1 MG/DL (ref 1.6–2.4)
MCH RBC QN AUTO: 23.4 PG (ref 26–34)
MCHC RBC AUTO-ENTMCNC: 29.3 G/DL (ref 30–36.5)
MCV RBC AUTO: 80 FL (ref 80–99)
NRBC # BLD: 0.03 K/UL (ref 0–0.01)
NRBC BLD-RTO: 0.7 PER 100 WBC
P-R INTERVAL, ECG05: 196 MS
PLATELET # BLD AUTO: 323 K/UL (ref 150–400)
PMV BLD AUTO: 9.7 FL (ref 8.9–12.9)
POTASSIUM SERPL-SCNC: 3.7 MMOL/L (ref 3.5–5.1)
Q-T INTERVAL, ECG07: 422 MS
QRS DURATION, ECG06: 110 MS
QTC CALCULATION (BEZET), ECG08: 471 MS
RBC # BLD AUTO: 4.19 M/UL (ref 4.1–5.7)
SODIUM SERPL-SCNC: 141 MMOL/L (ref 136–145)
VENTRICULAR RATE, ECG03: 75 BPM
WBC # BLD AUTO: 4.5 K/UL (ref 4.1–11.1)

## 2022-07-22 PROCEDURE — 94640 AIRWAY INHALATION TREATMENT: CPT

## 2022-07-22 PROCEDURE — 74011250636 HC RX REV CODE- 250/636: Performed by: HOSPITALIST

## 2022-07-22 PROCEDURE — 77010033678 HC OXYGEN DAILY

## 2022-07-22 PROCEDURE — 74011000250 HC RX REV CODE- 250: Performed by: HOSPITALIST

## 2022-07-22 PROCEDURE — 74011000250 HC RX REV CODE- 250: Performed by: INTERNAL MEDICINE

## 2022-07-22 PROCEDURE — 83735 ASSAY OF MAGNESIUM: CPT

## 2022-07-22 PROCEDURE — 85027 COMPLETE CBC AUTOMATED: CPT

## 2022-07-22 PROCEDURE — 94761 N-INVAS EAR/PLS OXIMETRY MLT: CPT

## 2022-07-22 PROCEDURE — 74011250637 HC RX REV CODE- 250/637: Performed by: HOSPITALIST

## 2022-07-22 PROCEDURE — 36415 COLL VENOUS BLD VENIPUNCTURE: CPT

## 2022-07-22 PROCEDURE — 94664 DEMO&/EVAL PT USE INHALER: CPT

## 2022-07-22 PROCEDURE — 74011250636 HC RX REV CODE- 250/636: Performed by: INTERNAL MEDICINE

## 2022-07-22 PROCEDURE — 65270000046 HC RM TELEMETRY

## 2022-07-22 PROCEDURE — 80048 BASIC METABOLIC PNL TOTAL CA: CPT

## 2022-07-22 RX ORDER — DULOXETIN HYDROCHLORIDE 30 MG/1
60 CAPSULE, DELAYED RELEASE ORAL DAILY
Status: DISCONTINUED | OUTPATIENT
Start: 2022-07-23 | End: 2022-07-23 | Stop reason: HOSPADM

## 2022-07-22 RX ORDER — LANOLIN ALCOHOL/MO/W.PET/CERES
325 CREAM (GRAM) TOPICAL DAILY
Status: DISCONTINUED | OUTPATIENT
Start: 2022-07-23 | End: 2022-07-23 | Stop reason: HOSPADM

## 2022-07-22 RX ORDER — CHLORTHALIDONE 25 MG/1
12.5 TABLET ORAL DAILY
Status: DISCONTINUED | OUTPATIENT
Start: 2022-07-23 | End: 2022-07-23 | Stop reason: HOSPADM

## 2022-07-22 RX ORDER — EZETIMIBE 10 MG/1
10 TABLET ORAL DAILY
Status: DISCONTINUED | OUTPATIENT
Start: 2022-07-23 | End: 2022-07-23 | Stop reason: HOSPADM

## 2022-07-22 RX ORDER — FUROSEMIDE 10 MG/ML
40 INJECTION INTRAMUSCULAR; INTRAVENOUS 2 TIMES DAILY
Status: DISCONTINUED | OUTPATIENT
Start: 2022-07-22 | End: 2022-07-23 | Stop reason: HOSPADM

## 2022-07-22 RX ORDER — METOPROLOL SUCCINATE 50 MG/1
50 TABLET, EXTENDED RELEASE ORAL DAILY
Status: DISCONTINUED | OUTPATIENT
Start: 2022-07-23 | End: 2022-07-23 | Stop reason: HOSPADM

## 2022-07-22 RX ORDER — BUDESONIDE 0.5 MG/2ML
500 INHALANT ORAL
Status: DISCONTINUED | OUTPATIENT
Start: 2022-07-22 | End: 2022-07-23 | Stop reason: HOSPADM

## 2022-07-22 RX ORDER — ROSUVASTATIN CALCIUM 10 MG/1
40 TABLET, COATED ORAL
Status: DISCONTINUED | OUTPATIENT
Start: 2022-07-22 | End: 2022-07-23 | Stop reason: HOSPADM

## 2022-07-22 RX ORDER — ARFORMOTEROL TARTRATE 15 UG/2ML
15 SOLUTION RESPIRATORY (INHALATION)
Status: DISCONTINUED | OUTPATIENT
Start: 2022-07-22 | End: 2022-07-23 | Stop reason: HOSPADM

## 2022-07-22 RX ADMIN — BUDESONIDE 500 MCG: 0.5 SUSPENSION RESPIRATORY (INHALATION) at 20:02

## 2022-07-22 RX ADMIN — FUROSEMIDE 40 MG: 10 INJECTION, SOLUTION INTRAMUSCULAR; INTRAVENOUS at 09:06

## 2022-07-22 RX ADMIN — ROSUVASTATIN CALCIUM 40 MG: 10 TABLET, FILM COATED ORAL at 21:52

## 2022-07-22 RX ADMIN — SODIUM CHLORIDE, PRESERVATIVE FREE 10 ML: 5 INJECTION INTRAVENOUS at 21:52

## 2022-07-22 RX ADMIN — ARFORMOTEROL TARTRATE 15 MCG: 15 SOLUTION RESPIRATORY (INHALATION) at 20:02

## 2022-07-22 RX ADMIN — IPRATROPIUM BROMIDE AND ALBUTEROL SULFATE 3 ML: .5; 3 SOLUTION RESPIRATORY (INHALATION) at 19:58

## 2022-07-22 RX ADMIN — IPRATROPIUM BROMIDE AND ALBUTEROL SULFATE 3 ML: .5; 3 SOLUTION RESPIRATORY (INHALATION) at 01:58

## 2022-07-22 RX ADMIN — IPRATROPIUM BROMIDE AND ALBUTEROL SULFATE 3 ML: .5; 3 SOLUTION RESPIRATORY (INHALATION) at 07:14

## 2022-07-22 RX ADMIN — METHYLPREDNISOLONE SODIUM SUCCINATE 40 MG: 40 INJECTION, POWDER, FOR SOLUTION INTRAMUSCULAR; INTRAVENOUS at 04:21

## 2022-07-22 RX ADMIN — SODIUM CHLORIDE, PRESERVATIVE FREE 10 ML: 5 INJECTION INTRAVENOUS at 09:06

## 2022-07-22 RX ADMIN — IPRATROPIUM BROMIDE AND ALBUTEROL SULFATE 3 ML: .5; 3 SOLUTION RESPIRATORY (INHALATION) at 15:04

## 2022-07-22 RX ADMIN — SODIUM CHLORIDE, PRESERVATIVE FREE 10 ML: 5 INJECTION INTRAVENOUS at 04:21

## 2022-07-22 RX ADMIN — FUROSEMIDE 40 MG: 10 INJECTION, SOLUTION INTRAMUSCULAR; INTRAVENOUS at 17:55

## 2022-07-22 NOTE — DISCHARGE INSTRUCTIONS
HOSPITALIST DISCHARGE INSTRUCTIONS  NAME: Yuri Hansen   :  1950   MRN:  [id number]     Date/Time:  2022 1:05 PM    ADMIT DATE: 2022     DISCHARGE DATE: 2022     DISCHARGE DIAGNOSIS:  Acute exacerbation of congestive heart failure secondary to inadequate water pill. Oxygen requirements from congestive heart failure    You are being discharged home today after an admission for acute exacerbation of congestive heart failure. As I discussed with you and your wife, I believe this is because you were recently discontinued on water pill given your last admission wherein you were dehydrated. We extensively discussed that we are trying to find a dose of water pill that would be sufficient for you and prevent you from going back into acute CHF or dehydrated. We discussed Lasix 20 mg daily, since you are already on chlorthalidone which is also a water pill. We also discussed that this dose may need to be increased or decreased. Continue to take potassium supplementation 20 M EQ daily with the Lasix. Follow-up with your primary care doctor as they will need to do blood test to check your electrolytes including your potassium levels as well as you kidney levels. MEDICATIONS:    It is important that you take the medication exactly as they are prescribed. Keep your medication in the bottles provided by the pharmacist and keep a list of the medication names, dosages, and times to be taken in your wallet. Do not take other medications without consulting your doctor. {Medication reconciliation information is now added to the patient's AVS automatically when it is printed. There is no need to use this SmartLink in discharge instructions.   Highlight this text and delete it to clear this message}      If you start feeling unwell or experience any of the following symptoms (including but not limited to), please call your primary care physician or return to the emergency room if you cannot get hold of your doctor:  Fever, chills, nausea, vomiting, diarrhea, change in mentation, falling, bleeding, shortness of breath. Follow Up:   @[facility]@  you are to call and set up an appointment to see them in 1 week. [unfilled]     Information obtained by :  I understand that if any problems occur once I am at home I am to contact my physician. I understand and acknowledge receipt of the instructions indicated above. Physician's or R.N.'s Signature                                                                  Date/Time                                                                                                                                              Patient or Representative Signature                                                          Date/Time      Avoiding Triggers With Heart Failure: Care Instructions  Your Care Instructions     Triggers are anything that make your heart failure flare up. A flare-up is also called \"sudden heart failure\" or \"acute heart failure. \" When you have a flare-up, fluid builds up in your lungs, and you have problems breathing. You might need to go to the hospital. By watching for changes in your condition and avoiding triggers, you can prevent heart failure flare-ups. Follow-up care is a key part of your treatment and safety. Be sure to make and go to all appointments, and call your doctor if you are having problems. It's also a good idea to know your test results and keep a list of the medicines you take. How can you care for yourself at home? Watch for changes in your weight and condition  Weigh yourself without clothing at the same time each day. Record your weight. Call your doctor if you have sudden weight gain, such as more than 2 to 3 pounds in a day or 5 pounds in a week.  (Your doctor may suggest a different range of weight gain.) A sudden weight gain may mean that your heart failure is getting worse. Keep a daily record of your symptoms. Write down any changes in how you feel, such as new shortness of breath, cough, or problems eating. Also record if your ankles are more swollen than usual and if you feel more tired than usual. Note anything that you ate or did that could have triggered these changes. Limit sodium  Sodium causes your body to hold on to extra water. This may cause your heart failure symptoms to get worse. People get most of their sodium from processed foods. Fast food and restaurant meals also tend to be very high in sodium. Your doctor may suggest that you limit sodium. Your doctor can tell you how much sodium is right for you. This includes limiting sodium in cooked and packaged foods. Read food labels on cans and food packages. They tell you how much sodium you get in one serving. Check the serving size. If you eat more than one serving, you are getting more sodium. Be aware that sodium can come in forms other than salt, including monosodium glutamate (MSG), sodium citrate, and sodium bicarbonate (baking soda). MSG is often added to Asian food. You can sometimes ask for food without MSG or salt. Slowly reducing salt will help you adjust to the taste. Take the salt shaker off the table. Flavor your food with garlic, lemon juice, onion, vinegar, herbs, and spices instead of salt. Do not use soy sauce, steak sauce, onion salt, garlic salt, mustard, or ketchup on your food, unless it is labeled \"low-sodium\" or \"low-salt. \"  Make your own salad dressings, sauces, and ketchup without adding salt. Use fresh or frozen ingredients, instead of canned ones, whenever you can. Choose low-sodium canned goods. Eat less processed food and food from restaurants, including fast food. Exercise as directed  Moderate, regular exercise is very good for your heart.  It improves your blood flow and helps control your weight. But too much exercise can stress your heart and cause a heart failure flare-up. Check with your doctor before you start an exercise program.  Walking is an easy way to get exercise. Start out slowly. Gradually increase the length and pace of your walk. Swimming, riding a bike, and using a treadmill are also good forms of exercise. When you exercise, watch for signs that your heart is working too hard. You are pushing yourself too hard if you cannot talk while you are exercising. If you become short of breath or dizzy or have chest pain, stop, sit down, and rest.  Do not exercise when you do not feel well. Take medicines correctly  Take your medicines exactly as prescribed. Call your doctor if you think you are having a problem with your medicine. Make a list of all the medicines you take. Include those prescribed to you by other doctors and any over-the-counter medicines, vitamins, or supplements you take. Take this list with you when you go to any doctor. Take your medicines at the same time every day. It may help you to post a list of all the medicines you take every day and what time of day you take them. Make taking your medicine as simple as you can. Plan times to take your medicines when you are doing other things, such as eating a meal or getting ready for bed. This will make it easier to remember to take your medicines. Get organized. Use helpful tools, such as daily or weekly pill containers. When should you call for help? Call 911  if you have symptoms of sudden heart failure such as: You have severe trouble breathing. You cough up pink, foamy mucus. You have a new irregular or rapid heartbeat. Call your doctor now or seek immediate medical care if:    You have new or increased shortness of breath. You are dizzy or lightheaded, or you feel like you may faint. You have sudden weight gain, such as more than 2 to 3 pounds in a day or 5 pounds in a week.  (Your doctor may suggest a different range of weight gain.)     You have increased swelling in your legs, ankles, or feet. You are suddenly so tired or weak that you cannot do your usual activities. Watch closely for changes in your health, and be sure to contact your doctor if you develop new symptoms. Where can you learn more? Go to http://www.gray.com/  Enter V089 in the search box to learn more about \"Avoiding Triggers With Heart Failure: Care Instructions. \"  Current as of: January 10, 2022               Content Version: 13.2  © 6426-2096 [employer]. Care instructions adapted under license by [employer] (which disclaims liability or warranty for this information). If you have questions about a medical condition or this instruction, always ask your healthcare professional. [address] any warranty or liability for your use of this information.

## 2022-07-22 NOTE — PROGRESS NOTES
7/22/2022  10:49 AM  CM completed assessment w/ pt and spouse in person, CM wore mask at all times. Charted demographics verified. .  Reason for Readmission:     Acute on Chronic CHF   Pt presents to [facility] c/o increasing SOB and dyspnea  Admission:  7/7-7/8/22  Dyspnea,  DC to home w/ outpatient follow up      PMHx:  AAA (abdominal aortic aneurysm) ([address]        s/p repair    Anemia      Atrial fibrillation (HCC)      CAD (coronary artery disease)      Carotid stenosis, left       s/p repair    Depression       anger issues    High cholesterol      Hypertension      Liver failure (HCC)      Macular degeneration      Necrotizing pneumonia (      RUR Score/Risk Level:     14 % Low Risk of Readmission/Green    PCP: First and Last name:  Vidal Delvalle MD   Name of Practice:    Are you a current patient: Yes/No: yes    Approximate date of last visit: 7/13/22   Can you participate in a virtual visit with your PCP: yes     Is a Care Conference indicated: NO      Did you attend your follow up appointment (s): If not, why not:pt attended all scheduled appts         Resources/supports as identified by patient/family:          Top Challenges facing patient (as identified by patient/family and CM): Finances/Medication cost?   Medicare A/B, [employer], pt uses [facility], usually covered for his medications, Eliquis is  expensive but he fills Rx   Transportation      pt and wife both drive  Support system or lack thereof? Pt has very supportive wife who can assist if needed   Living arrangements? Pt live w/ wife in 2 story home, w/ 4 entry steps an handrail, at baseline pt is ambulatory, independent  ADLs,   Self-care/ADLs/Cognition?      Pt AAO x4, [place], can perform ADLS independently        Current Advanced Directive/Advance Care Plan:     Pt has ACP, spouse Jeremías Grace (C) [phone] is NOK      Plan for utilizing home health:   No history of HH or Rehab  DME: None  COVID Vax Hx: Moderna, 2 jabs and 1 booster all in 2021             Transition of Care Plan:    Based on readmission, the patient's previous Plan of Care   has been evaluated and/or modified. The current Transition of Care Plan is:       Michelle Carota for medical management, pt o 3L O2, wean as tolerated, oximetry prior to DC , CM to follow for home O2 needs   CM to follow through for treatment/response  DC when stable to home w/ family assistance and [facility]  Outpatient follow up PCP, specialists  Wife will transport at DC   6. CM will follow and assist w/ DC needs   Readmission Assessment  Number of days since last admission?: 8-30 days  Previous disposition: Home with Family  Who is being interviewed?: Patient, Caregiver  What was the patient's/caregiver's perception as to why they think they needed to return back to the hospital?: Other (Comment) (Recurrent symptoms)  Did you visit your Primary Care Physician after you left the hospital, before you returned this time?: Yes (7/13/22)  Did you see a specialist, such as Cardiac, Pulmonary, Orthopedic Physician, etc. after you left the hospital?: Yes (cardiology 7/14/22)  Who advised the patient to return to the hospital?: Self-referral  Does the patient report anything that got in the way of taking their medications?: No (pt filled all Rx at DC)  In our efforts to provide the best possible care to you and others like you, can you think of anything that we could have done to help you after you left the hospital the first time, so that you might not have needed to return so soon?: Other (Comment) (pt felt need for O2 was not addressed)  Care Management Interventions  PCP Verified by CM:  Yes Sally Mcdermott MD )  Last Visit to PCP: 07/13/22  Palliative Care Criteria Met (RRAT>21 & CHF Dx)?: No  Mode of Transport at Discharge: Self (Family)  Physical Therapy Consult: No  Occupational Therapy Consult: No  Support Systems: Spouse/Significant Other (pt lives w/ spouse in pvt residence, is ambulatory, iADLS, drives)  Confirm Follow Up Transport: Family  Discharge Location  Patient Expects to be Discharged to[de-identified] Home with home health  JOSE RAMON Lau

## 2022-07-22 NOTE — ED NOTES
Bedside and Verbal shift change report given to Wali Zuniga (oncoming nurse) by Garrett (offgoing nurse). Report included the following information SBAR, Kardex, and ED Summary.

## 2022-07-22 NOTE — PROGRESS NOTES
[facility]  [address] [place] [address], [address] [place] [address]  [phone]      Medical Progress Note      NAME: Maurice Salinas   :  1950  MRM:  [id number]    Date/Time: 2022  1:45 PM           Assessment / Plan:   Acute on chronic diastolic congestive heart failure ([facility] (2022)  Acute hypoxic respiratory failure   Patient presenting with worsening shortness of breath, mild lower extremity edema but no significant weight loss. Noted patient was admitted earlier in July and diuretic was discontinued. With chest x-ray finding and elevated BNP, I suspect acute CHF exacerbation responsible for his shortness of breath. Echocardiogram done earlier this morning reviewed with an EF of 55%. Aggressive diuresis with Lasix 40 mg IV twice daily has been started. Strict I's and O's. Monitor renal functions with electrolytes and appropriate repletion. Low-sodium diet. Daily weight. Resume home metoprolol. Discontinued antibiotics and steroids; steroids may help further worsen fluid retention. Wean oxygen as tolerated. Wife hopeful that patient will qualify for oxygen upon discharge. COPD exacerbation ([facility] (2022): DuoNebs for now. Paroxysmal atrial fibrillation  Home Xarelto resumed. Care Plan discussed with: Patient. Wife at bedside. Prophylaxis:   Home Xarelto. Disposition:  Home w/Family           ___________________________________________________    Attending Physician: Matilde García MD        Subjective:     Chief Complaint: Shortness of breath on mild exertion. No current pedal edema. No fever or chills. ROS:  Rest of review of systems negative otherwise. Objective:       Vitals:     Last 24hrs VS reviewed since prior progress note.  Most recent are:    Visit Vitals  BP (!) 140/71   Pulse 80   Temp 97.7 °F (36.5 °C)   Resp 22   Ht 5' 10\" (1.778 m)   Wt 93 kg (205 lb)   SpO2 (!) 86%   BMI 29.41 kg/m²     SpO2 Readings from Last 6 Encounters:   07/22/22 (!) 86%   07/08/22 95%   08/03/19 92%   10/04/15 93%   03/11/14 94%   01/31/14 94%    O2 Flow Rate (L/min): 3 l/min   No intake or output data in the 24 hours ending 07/22/22 3675       Exam:     Physical Exam:    Gen:  No acute distress. HEENT:  NC/AT. Pink conjunctivae, hard of hearing, moist mucous membranes. Neck:  Supple. Resp:  Unlabored breathing. Mild basal crackles. Card:  Regular rate and rhythm. Normal S1 and S2. No murmurs. Abd:  Soft, non-tender, non-distended, normoactive bowel sounds. Ext: No clubbing, cyanosis or edema. Skin:  No rashes or ulcers, skin turgor is good. Neuro:  Moving all extremities with no gross focal deficits appreciated except hearing loss. Psych:  Good insight, oriented to person, place and time, alert.     Medications Reviewed: (see below)    Lab Data Reviewed: (see below)  ______________________________________________________________________    Medications:     Current Facility-Administered Medications   Medication Dose Route Frequency    furosemide (LASIX) injection 40 mg  40 mg IntraVENous BID    [START ON 7/23/2022] chlorthalidone (HYGROTON) tablet 12.5 mg  12.5 mg Oral DAILY    [START ON 7/23/2022] DULoxetine (CYMBALTA) capsule 60 mg  60 mg Oral DAILY    [START ON 7/23/2022] ezetimibe (ZETIA) tablet 10 mg  10 mg Oral DAILY    [START ON 7/23/2022] ferrous sulfate tablet 325 mg  325 mg Oral DAILY    arformoterol 15 mcg/budesonide 0.5 mg neb solution   Nebulization BID RT    [START ON 7/23/2022] metoprolol succinate (TOPROL-XL) XL tablet 50 mg  50 mg Oral DAILY    rosuvastatin (CRESTOR) tablet 40 mg  40 mg Oral QHS    [START ON 7/23/2022] rivaroxaban (XARELTO) tablet 20 mg  20 mg Oral DAILY    sodium chloride (NS) flush 5-40 mL  5-40 mL IntraVENous Q8H    sodium chloride (NS) flush 5-40 mL  5-40 mL IntraVENous PRN    polyethylene glycol (MIRALAX) packet 17 g  17 g Oral DAILY PRN    ondansetron (ZOFRAN ODT) tablet 4 mg  4 mg Oral Q8H PRN    Or    ondansetron (ZOFRAN) injection 4 mg  4 mg IntraVENous Q6H PRN    azithromycin (ZITHROMAX) 500 mg in 0.9% sodium chloride 250 mL ([id number])  500 mg IntraVENous Q24H    albuterol-ipratropium (DUO-NEB) 2.5 MG-0.5 MG/3 ML  3 mL Nebulization Q6H RT     Current Outpatient Medications   Medication Sig    ferrous sulfate 325 mg (65 mg iron) tablet Take 325 mg by mouth daily. ezetimibe (Zetia) 10 mg tablet Take 10 mg by mouth daily. fluticasone furoate-vilanteroL (Breo Ellipta) 200-25 mcg/dose inhaler Take 1 Puff by inhalation daily. tiotropium bromide (Spiriva Respimat) 2.5 mcg/actuation inhaler Take 2 Puffs by inhalation daily. DULoxetine (CYMBALTA) 60 mg capsule Take 60 mg by mouth daily. potassium chloride SR (KLOR-CON 10) 10 mEq tablet Take 20 mEq by mouth daily. TURMERIC PO Take 1 Capsule by mouth daily. berberine/herbal complex no.18 (BERBERINE-HERBAL COMB NO.18 PO) Take 2 Capsules by mouth daily. rivaroxaban (XARELTO) 20 mg tab tablet Take 20 mg by mouth daily (with dinner). chlorthalidone (HYGROTEN) 25 mg tablet Take 12.5 mg by mouth daily. rosuvastatin (CRESTOR) 40 mg tablet Take 40 mg by mouth nightly. ascorbic acid, vitamin C, (VITAMIN C) 500 mg tablet Take 500 mg by mouth daily. vit A/vit C/vit E/zinc/copper (ICAPS AREDS PO) Take 2 Tabs by mouth daily. cholecalciferol (VITAMIN D3) 1,000 unit cap Take 2,000 Units by mouth daily. albuterol (PROVENTIL HFA) 90 mcg/actuation inhaler Take 1 Puff by inhalation every four (4) hours as needed for Wheezing. metoprolol succinate (TOPROL-XL) 50 mg XL tablet Take 50 mg by mouth daily.         Lab Review:     Recent Labs     07/22/22  0420 07/21/22  1206   WBC 4.5 8.7   HGB 9.8* 10.9*   HCT 33.5* 37.2    353     Recent Labs     07/22/22  0420 07/21/22  1206    138   K 3.7 3.8    105   CO2 28 28   * 165*   BUN 18 17   CREA 1.04 1.12   CA 8.2* 8.8   MG 2.0 2.0   ALB  --  3.2*   ALT --  20     No components found for: [place]

## 2022-07-22 NOTE — NURSE NAVIGATOR
Chart reviewed by Heart Failure Nurse Navigator. Heart Failure database completed. Patient was admitted 7/7/22 to 7/8/22 with dyspnea and BERONICA thought to be due to dehydration. Seen by Cardiology who did not think Echo showed a mechanism for CHF and discontinued bumex which had recently been started at outside hospital.  Patient returned to [facility] on 7/21 with hypoxia, cough, and shortness of breath. Admitted with  acute on chronic diastolic HF and COPD exacerbation. EF:  55% with normal wall thickness, abnormal diastolic function, LA moderately dilated. ACEi/ARB/ARNi: **    BB: Metoprolol succinate 50 mg daily    Aldosterone Antagonist: **    Obstructive Sleep Apnea Screening:   Referred to Sleep Medicine:     CRT **. NYHA Functional Class **. Heart Failure Teach Back in Patient Education. Heart Failure Avoiding Triggers on Discharge Instructions. Cardiologist: Dr Marcia Mccollum discharge follow up phone call to be made within 48-72 hours of discharge.

## 2022-07-22 NOTE — ED NOTES
Verbal shift change report given to Tyrone Berry (oncoming nurse) by Mateo Damon RN (offgoing nurse). Report included the following information SBAR, Kardex, ED Summary and MAR.

## 2022-07-22 NOTE — PROGRESS NOTES
Dior IN REPORT:    Verbal report received from [facility]) on Jennifer Molina  being received from ED(unit) for routine progression of care      Report consisted of patients Situation, Background, Assessment and   Recommendations(SBAR). Information from the following report(s) SBAR, Kardex, ED Summary, Intake/Output, MAR, Recent Results, and Cardiac Rhythm NSR  was reviewed with the receiving nurse. Opportunity for questions and clarification was provided. Assessment completed upon patients arrival to unit and care assumed. 1607 Pt arrives to unit, on assessment noticed that Pt had different sized pupils, Pt states that he has macular degeneration and that this has been present of \"4-5 years\". Messaged Dr. Irwin Suarez who saw Pt this morning, MD noticed this on his assessment, no further orders obtained as this is not an acute change. Also notified Dr. Jazmin Birch who states that \"no additional tests\" are warranted \"if not a new symptom\". Will continue monitoring. No other signs or symptoms of stroke are noted. Confirmed with Pt Wife this is normal and no changes from baseline has been noted. 1930 Bedside and Verbal shift change report given to One Kerry Albert (oncoming nurse) by Hyacinth Rinne RN (offgoing nurse). Report included the following information SBAR, Kardex, ED Summary, Intake/Output, MAR, Recent Results, and Cardiac Rhythm NSR .

## 2022-07-23 VITALS
WEIGHT: 205.03 LBS | DIASTOLIC BLOOD PRESSURE: 89 MMHG | TEMPERATURE: 97.5 F | RESPIRATION RATE: 17 BRPM | BODY MASS INDEX: 29.35 KG/M2 | HEIGHT: 70 IN | OXYGEN SATURATION: 95 % | SYSTOLIC BLOOD PRESSURE: 127 MMHG | HEART RATE: 103 BPM

## 2022-07-23 LAB
ANION GAP SERPL CALC-SCNC: 10 MMOL/L (ref 5–15)
BASOPHILS # BLD: 0 K/UL (ref 0–0.1)
BASOPHILS NFR BLD: 0 % (ref 0–1)
BUN SERPL-MCNC: 24 MG/DL (ref 6–20)
BUN/CREAT SERPL: 21 (ref 12–20)
CALCIUM SERPL-MCNC: 8.8 MG/DL (ref 8.5–10.1)
CHLORIDE SERPL-SCNC: 100 MMOL/L (ref 97–108)
CO2 SERPL-SCNC: 30 MMOL/L (ref 21–32)
CREAT SERPL-MCNC: 1.16 MG/DL (ref 0.7–1.3)
DIFFERENTIAL METHOD BLD: ABNORMAL
EOSINOPHIL # BLD: 0 K/UL (ref 0–0.4)
EOSINOPHIL NFR BLD: 0 % (ref 0–7)
ERYTHROCYTE [DISTWIDTH] IN BLOOD BY AUTOMATED COUNT: 22.5 % (ref 11.5–14.5)
GLUCOSE SERPL-MCNC: 141 MG/DL (ref 65–100)
HCT VFR BLD AUTO: 37.3 % (ref 36.6–50.3)
HGB BLD-MCNC: 10.7 G/DL (ref 12.1–17)
IMM GRANULOCYTES # BLD AUTO: 0.1 K/UL (ref 0–0.04)
IMM GRANULOCYTES NFR BLD AUTO: 1 % (ref 0–0.5)
LYMPHOCYTES # BLD: 1.4 K/UL (ref 0.8–3.5)
LYMPHOCYTES NFR BLD: 9 % (ref 12–49)
MCH RBC QN AUTO: 23.2 PG (ref 26–34)
MCHC RBC AUTO-ENTMCNC: 28.7 G/DL (ref 30–36.5)
MCV RBC AUTO: 80.7 FL (ref 80–99)
MONOCYTES # BLD: 1.5 K/UL (ref 0–1)
MONOCYTES NFR BLD: 9 % (ref 5–13)
NEUTS SEG # BLD: 12.5 K/UL (ref 1.8–8)
NEUTS SEG NFR BLD: 81 % (ref 32–75)
NRBC # BLD: 0 K/UL (ref 0–0.01)
NRBC BLD-RTO: 0 PER 100 WBC
PLATELET # BLD AUTO: 344 K/UL (ref 150–400)
PMV BLD AUTO: 9.4 FL (ref 8.9–12.9)
POTASSIUM SERPL-SCNC: 3.4 MMOL/L (ref 3.5–5.1)
RBC # BLD AUTO: 4.62 M/UL (ref 4.1–5.7)
SODIUM SERPL-SCNC: 140 MMOL/L (ref 136–145)
WBC # BLD AUTO: 15.5 K/UL (ref 4.1–11.1)

## 2022-07-23 PROCEDURE — 93005 ELECTROCARDIOGRAM TRACING: CPT

## 2022-07-23 PROCEDURE — 80048 BASIC METABOLIC PNL TOTAL CA: CPT

## 2022-07-23 PROCEDURE — 74011000250 HC RX REV CODE- 250: Performed by: INTERNAL MEDICINE

## 2022-07-23 PROCEDURE — 74011000250 HC RX REV CODE- 250: Performed by: HOSPITALIST

## 2022-07-23 PROCEDURE — 94618 PULMONARY STRESS TESTING: CPT

## 2022-07-23 PROCEDURE — 94640 AIRWAY INHALATION TREATMENT: CPT

## 2022-07-23 PROCEDURE — 74011250636 HC RX REV CODE- 250/636: Performed by: HOSPITALIST

## 2022-07-23 PROCEDURE — 94761 N-INVAS EAR/PLS OXIMETRY MLT: CPT

## 2022-07-23 PROCEDURE — 74011250637 HC RX REV CODE- 250/637: Performed by: HOSPITALIST

## 2022-07-23 PROCEDURE — 85025 COMPLETE CBC W/AUTO DIFF WBC: CPT

## 2022-07-23 PROCEDURE — 36415 COLL VENOUS BLD VENIPUNCTURE: CPT

## 2022-07-23 PROCEDURE — 77010033678 HC OXYGEN DAILY

## 2022-07-23 RX ORDER — ALBUTEROL SULFATE 90 UG/1
1 AEROSOL, METERED RESPIRATORY (INHALATION)
Qty: 18 G | Refills: 0 | Status: SHIPPED | OUTPATIENT
Start: 2022-07-23

## 2022-07-23 RX ORDER — POTASSIUM CHLORIDE 750 MG/1
40 TABLET, FILM COATED, EXTENDED RELEASE ORAL
Status: COMPLETED | OUTPATIENT
Start: 2022-07-23 | End: 2022-07-23

## 2022-07-23 RX ORDER — METOPROLOL TARTRATE 5 MG/5ML
2.5 INJECTION INTRAVENOUS ONCE
Status: COMPLETED | OUTPATIENT
Start: 2022-07-23 | End: 2022-07-23

## 2022-07-23 RX ORDER — FUROSEMIDE 20 MG/1
20 TABLET ORAL DAILY
Qty: 30 TABLET | Refills: 0 | Status: SHIPPED | OUTPATIENT
Start: 2022-07-23

## 2022-07-23 RX ADMIN — CHLORTHALIDONE 12.5 MG: 25 TABLET ORAL at 09:19

## 2022-07-23 RX ADMIN — POTASSIUM CHLORIDE 40 MEQ: 750 TABLET, FILM COATED, EXTENDED RELEASE ORAL at 10:51

## 2022-07-23 RX ADMIN — IPRATROPIUM BROMIDE AND ALBUTEROL SULFATE 3 ML: .5; 3 SOLUTION RESPIRATORY (INHALATION) at 07:33

## 2022-07-23 RX ADMIN — DULOXETINE HYDROCHLORIDE 60 MG: 30 CAPSULE, DELAYED RELEASE ORAL at 09:18

## 2022-07-23 RX ADMIN — METOPROLOL TARTRATE 2.5 MG: 5 INJECTION INTRAVENOUS at 00:49

## 2022-07-23 RX ADMIN — SODIUM CHLORIDE, PRESERVATIVE FREE 10 ML: 5 INJECTION INTRAVENOUS at 06:56

## 2022-07-23 RX ADMIN — FUROSEMIDE 40 MG: 10 INJECTION, SOLUTION INTRAMUSCULAR; INTRAVENOUS at 09:20

## 2022-07-23 RX ADMIN — FERROUS SULFATE TAB 325 MG (65 MG ELEMENTAL FE) 325 MG: 325 (65 FE) TAB at 09:18

## 2022-07-23 RX ADMIN — ARFORMOTEROL TARTRATE 15 MCG: 15 SOLUTION RESPIRATORY (INHALATION) at 07:27

## 2022-07-23 RX ADMIN — IPRATROPIUM BROMIDE AND ALBUTEROL SULFATE 3 ML: .5; 3 SOLUTION RESPIRATORY (INHALATION) at 14:43

## 2022-07-23 RX ADMIN — IPRATROPIUM BROMIDE AND ALBUTEROL SULFATE 3 ML: .5; 3 SOLUTION RESPIRATORY (INHALATION) at 01:55

## 2022-07-23 RX ADMIN — EZETIMIBE 10 MG: 10 TABLET ORAL at 09:19

## 2022-07-23 RX ADMIN — RIVAROXABAN 20 MG: 20 TABLET, FILM COATED ORAL at 09:18

## 2022-07-23 RX ADMIN — BUDESONIDE 500 MCG: 0.5 SUSPENSION RESPIRATORY (INHALATION) at 07:27

## 2022-07-23 RX ADMIN — METOPROLOL SUCCINATE 50 MG: 50 TABLET, EXTENDED RELEASE ORAL at 09:19

## 2022-07-23 RX ADMIN — SODIUM CHLORIDE, PRESERVATIVE FREE 10 ML: 5 INJECTION INTRAVENOUS at 00:49

## 2022-07-23 NOTE — DISCHARGE SUMMARY
Physician Discharge Summary     Patient ID:  Jacqueline King  [id number]  67 y.o.  1950    Admit date: 7/21/2022    Discharge date and time: 7/23/2022    Admission Diagnoses: Acute on chronic congestive heart failure [facility]) [I50.9]    Discharge Diagnoses:    Acute on chronic diastolic congestive heart failure  Acute hypoxic respiratory failure    Hospital Course:   Acute on chronic diastolic congestive heart failure ([facility] (7/21/2022)  Acute hypoxic respiratory failure   Patient presenting with worsening shortness of breath, mild lower extremity edema but no significant weight loss. Noted patient was admitted earlier in July and diuretic was discontinued. With chest x-ray finding and elevated BNP, I suspect acute CHF exacerbation responsible for his shortness of breath. Echocardiogram done earlier this morning reviewed with an EF of 55%. Patient was diuresed with Lasix with strict monitoring of I's and O's, renal functions as well as electrolytes. Potassium was appropriately repleted. Plans to discharge home today on Lasix 20 mg daily on top of patient's chlorthalidone he already takes at home. Qualifies for home oxygen usage. Hypokalemia. Appropriately repleted. COPD exacerbation ([facility] (7/21/2022): Home medications to be resumed. Leukocytosis: Steroid induced. Afebrile and nontoxic-appearing. No obvious other indications of acute infection. Paroxysmal atrial fibrillation: Beta-blocker and Xarelto upon discharge. PCP: Nakita Diaz MD     Consults: None    Condition of patient at discharge: good    Discharge Exam:    Physical Exam:    Gen:  No acute distress. HEENT:  NC/AT. Pink conjunctivae, hard of hearing, moist mucous membranes. Neck:  Supple. Resp:  Unlabored breathing. Mild basal crackles. Card:  Regular rate and rhythm. Normal S1 and S2. No murmurs. Abd:  Soft, non-tender, non-distended, normoactive bowel sounds. Ext: No clubbing, cyanosis or edema.   Skin:  No rashes or ulcers, skin turgor is good. Neuro:  Moving all extremities with no gross focal deficits appreciated except hearing loss. Psych:  Good insight, oriented to person, place and time, alert. Disposition: home    Patient Instructions:   Current Discharge Medication List        START taking these medications    Details   furosemide (Lasix) 20 mg tablet Take 1 Tablet by mouth in the morning. Indications: fluid in the lungs due to chronic heart failure  Qty: 30 Tablet, Refills: 0           CONTINUE these medications which have NOT CHANGED    Details   ferrous sulfate 325 mg (65 mg iron) tablet Take 325 mg by mouth daily. ezetimibe (Zetia) 10 mg tablet Take 10 mg by mouth daily. fluticasone furoate-vilanteroL (Breo Ellipta) 200-25 mcg/dose inhaler Take 1 Puff by inhalation daily. tiotropium bromide (Spiriva Respimat) 2.5 mcg/actuation inhaler Take 2 Puffs by inhalation daily. DULoxetine (CYMBALTA) 60 mg capsule Take 60 mg by mouth daily. potassium chloride SR (KLOR-CON 10) 10 mEq tablet Take 20 mEq by mouth daily. TURMERIC PO Take 1 Capsule by mouth daily. berberine/herbal complex no.18 (BERBERINE-HERBAL COMB NO.18 PO) Take 2 Capsules by mouth daily. rivaroxaban (XARELTO) 20 mg tab tablet Take 20 mg by mouth daily (with dinner). chlorthalidone (HYGROTEN) 25 mg tablet Take 12.5 mg by mouth daily. rosuvastatin (CRESTOR) 40 mg tablet Take 40 mg by mouth nightly. ascorbic acid, vitamin C, (VITAMIN C) 500 mg tablet Take 500 mg by mouth daily. vit A/vit C/vit E/zinc/copper (ICAPS AREDS PO) Take 2 Tabs by mouth daily. cholecalciferol (VITAMIN D3) 1,000 unit cap Take 2,000 Units by mouth daily. albuterol (PROVENTIL HFA) 90 mcg/actuation inhaler Take 1 Puff by inhalation every four (4) hours as needed for Wheezing. Qty: 1 Inhaler, Refills: 0      metoprolol succinate (TOPROL-XL) 50 mg XL tablet Take 50 mg by mouth daily.            Activity: Activity as tolerated  Diet: Cardiac Diet  Wound Care: None needed    Follow-up with Becky Astudillo MD in 3 day. Follow-up tests/labs: You are being discharged home today after an admission for acute exacerbation of congestive heart failure. As I discussed with you and your wife, I believe this is because you were recently discontinued on water pill given your last admission wherein you were dehydrated. We extensively discussed that we are trying to find a dose of water pill that would be sufficient for you and prevent you from going back into acute CHF or dehydrated. We discussed Lasix 20 mg daily, since you are already on chlorthalidone which is also a water pill. We also discussed that this dose may need to be increased or decreased. Continue to take potassium supplementation 20 M EQ daily with the Lasix. Follow-up with your primary care doctor as they will need to do blood test to check your electrolytes including your potassium levels as well as you kidney levels. Approximate time spent in patient care on day of discharge: 35 minutes.     Signed:  Lesly Alan MD  7/23/2022  1:08 PM

## 2022-07-23 NOTE — PROGRESS NOTES
07/23/22 1200 07/23/22 1203 07/23/22 1209   RT Walking Oximetry   Stage Resting (Room Air) During Walk (Room Air) During Walk (on O2)   SpO2 96 % (!) 86 % 90 %   HR 78 bpm 100 bpm 90 bpm   Rate of Dyspnea 0 1 1   Symptoms  --   --  Shortness of Breath   O2 Device  --   --  Nasal cannula   O2 Flow Rate (L/min)  --   --  2 l/min      07/23/22 1214   RT Walking Oximetry   Stage After Walk   SpO2 93 %   HR 88 bpm   Rate of Dyspnea 0   Symptoms  --    O2 Device Nasal cannula   O2 Flow Rate (L/min) 2 l/min

## 2022-07-23 NOTE — PROGRESS NOTES
0700  Bedside and Verbal shift change report given to Gardiner Soulier, RN (oncoming nurse) by Ema Hampton RN (offgoing nurse). Report included the following information SBAR, Kardex, Procedure Summary, Intake/Output, MAR, Recent Results, and Med Rec Status. 1600  I have reviewed discharge instructions with the patient. The patient verbalized understanding. IV removed. Tele box removed and tele tech notiffied of DC. Tele box cleaned and placed in cabinet next to Py chart. Signed copy of AVS placed in Pt chart.

## 2022-07-23 NOTE — PROGRESS NOTES
1930 Hour: Received patient from previous shift. Stable Denies SOB. In normal sinus rhythm. HR 83bpm. On 3 liters oxygen SpO2 95% range. 0030 Hour: Monitor tech called to nurse that patient was in A Fib  12 Lead EKG obtained. B/P 133/76, HR 114bpm, SpO2 91%. 12 Lead EKG obtained  No noted HX of A Fib, Patient is very hard of hearing very difficult to make understand questions regarding cardiac history. 0045 Hour:Medically managed  per MD order. Patient does have a Hx of P. A Fib.  02:00 Hour: Patient remains in A fib rhythm, uncontrolled rate from 113-130  bpm with activity.

## 2022-07-23 NOTE — PROGRESS NOTES
1:52 PM  Medicare pt has received, reviewed, and signed 2nd IM letter informing them of their right to appeal the discharge. Signed copy has been placed on pt bedside chart. Molly Resendez      7/23/22  1:28 PM    SHILPA noted patient will need home O2. CM attempted to call into patients room but he was not answering. Called his spouse to discuss, she is agreeable to home O2 plan. CM called and spoke to [place] with [employer]. They can deliver the home O2 today once all documentation is sent in through [employer]. CM sent documentation and  has been notified to deliver the portable O2 tank to the hospital. Once patient has the portable O2 tank and has made arrangements for the delivery of the concentrator, patient can discharge. Patients spouse will provide transportation.  Molly Resendez

## 2022-07-25 ENCOUNTER — PATIENT OUTREACH (OUTPATIENT)
Dept: CASE MANAGEMENT | Age: 72
End: 2022-07-25

## 2022-07-25 LAB
ATRIAL RATE: 326 BPM
CALCULATED R AXIS, ECG10: -28 DEGREES
CALCULATED T AXIS, ECG11: 105 DEGREES
DIAGNOSIS, 93000: NORMAL
Q-T INTERVAL, ECG07: 322 MS
QRS DURATION, ECG06: 112 MS
QTC CALCULATION (BEZET), ECG08: 443 MS
VENTRICULAR RATE, ECG03: 114 BPM

## 2022-07-25 NOTE — PROGRESS NOTES
Care Transitions Initial Call    Call within 2 business days of discharge: Yes     Patient: Danica Shen Patient : 1950 MRN: [id number]    Last Discharge  Street       Date Complaint Diagnosis Description Type Department Provider    22 Shortness of Breath Acute on chronic congestive heart failure, unspecified heart failure type ([facility] [address] . .. ED to Hosp-Admission (Discharged) (ADMIT) Behzad Lynch MD; [facility]. .. Was this an external facility discharge? No Discharge Facility: [facility] - Acute on chronic CHF/Hypoxic respiratory failure    Challenges to be reviewed by the provider   Additional needs identified to be addressed with provider: yes  [facility] - Acute on chronic CHF/Hypoxic respiratory failure. Method of communication with provider : chart routing    Discussed [phone] related testing which was not done at this time. Advance Care Planning:   Does patient have an Advance Directive: not on file, education declined at this time. Inpatient Readmission Risk score: Unplanned Readmit Risk Score: 16.4    Was this a readmission? yes   Patient stated reason for the admission: sob, edema    Patients top risk factors for readmission: medical condition-h/o CAD,Afib,carotid stenosis s/p repair,HTN,CHF with EF 55%    Interventions to address risk factors: Scheduled appointment with Ruben Verdin  at 1pm, Scheduled appointment with Specialist-schedule f/u with cardio, and Obtained and reviewed discharge summary and/or continuity of care documents    Care Transition Nurse (CTN) contacted the family by telephone to perform post hospital discharge assessment. Verified name and  with family as identifiers. Provided introduction to self, and explanation of the CTN role. CTN reviewed discharge instructions, medical action plan and red flags with family who verbalized understanding. Were discharge instructions available to patient? yes.  Reviewed appropriate site of care based on symptoms and resources available to patient including: PCP, Specialist, Urgent Care Clinics, When to call 911, and [employer]. Family given an opportunity to ask questions and does not have any further questions or concerns at this time. The family agrees to contact the PCP office for questions related to their healthcare. Medication reconciliation was performed with family, who verbalizes understanding of administration of home medications. Advised obtaining a 90-day supply of all daily and as-needed medications. Referral to Pharm D needed: no     Home Health/Outpatient orders at discharge: [address]: na  Date of initial visit: na    Durable Medical Equipment ordered at discharge: [address] received: yes, prior to discharge    Was patient discharged with a pulse oximeter? no- had one at home. Discussed follow-up appointments. If no appointment was previously scheduled, appointment scheduling offered: yes. Is follow up appointment scheduled within 7 days of discharge? yes. [facility] follow up appointment(s):   Future Appointments   Date Time Provider Jazlyn Willson   8/3/2022  3:00 PM [address] [place] [address] RCR PET DOSE 1 [facility]CHPET SALGUERO JORGE   8/3/2022  4:00 PM [address] [place] [address] RCR PET 1 [facility]HRCHPET SALGUERO JORGE     Non-[facility] follow up appointment(s): petar Bernal, 7/28 at 1pm    Plan for follow-up call in 5-7 days based on severity of symptoms and risk factors. Plan for next call: symptom management-assess current symptoms, self management-following discharge instructions, follow up appointment-saw pcp for CYDNEY visit, and medication management-taking meds as ordered. CTN provided contact information for future needs.       Heart Failure Note20    Do you have a Scale:    yes   How often do you weigh:  several times a week    Daily Weight (document daily weights in flowsheets):    stable- comparable to weight at discharge. Amount:  205lb       Provider Notified:    wife will monitor q am and call if he gains > 3 lb in 2 days or > 5 lb in one week. Zone:(Pt Reported)  green     EF: 55%(result) on 7/8/22 (date)   Type of HF:   HFpEF     Cardiac Device present:  none       Heart Failure Medications: Diuretic- Lasix and Chlorthalidone, BB- Metoprolol, Potassium, Xarelto       Goals Addressed                   This Visit's Progress     Patient verbalizes understanding of self-management goals of living with Congestive Heart Failure        7/26/22 [facility] 7/19-7/23 CHF  Reviewed discharge instructions with wife using teach back. Reviewed meds with wife, education provided on new med, using teach back. Reviewed red flags: sob, weight gain, swelling in lower legs/feet, chest discomfort, fever,nausea,vomiting,diarrhea. Reminded wife to check his weight q am, wearing same clothing, same time of day- record weights- if gains > 3 lb in 2 days or gains > 5 lb in one week, to notify MD asap for changes in meds. Follow low salt diet  CYDNEY with pcp, , 7/28 at 1pm.  F/u with cardi within 1 week. Wife to schedule. Given info on [employer] as resource. Given CTN contact info if questions/concerns. cole

## 2022-08-03 ENCOUNTER — PATIENT OUTREACH (OUTPATIENT)
Dept: CASE MANAGEMENT | Age: 72
End: 2022-08-03

## 2022-08-03 ENCOUNTER — HOSPITAL ENCOUNTER (OUTPATIENT)
Dept: PET IMAGING | Age: 72
Discharge: HOME | End: 2022-08-03
Payer: MEDICARE

## 2022-08-03 VITALS — BODY MASS INDEX: 29.41 KG/M2 | WEIGHT: 205 LBS

## 2022-08-03 DIAGNOSIS — R91.1 PULMONARY NODULE: ICD-10-CM

## 2022-08-03 LAB
GLUCOSE BLD STRIP.AUTO-MCNC: 78 MG/DL (ref 65–117)
SERVICE CMNT-IMP: NORMAL

## 2022-08-03 PROCEDURE — A9552 F18 FDG: HCPCS

## 2022-08-03 RX ORDER — FLUDEOXYGLUCOSE F-18 200 MCI/ML
10.42 INJECTION INTRAVENOUS ONCE
Status: COMPLETED | OUTPATIENT
Start: 2022-08-03 | End: 2022-08-03

## 2022-08-03 RX ADMIN — FLUDEOXYGLUCOSE F-18 10.42 MILLICURIE: 200 INJECTION INTRAVENOUS at 14:38

## 2022-08-17 ENCOUNTER — PATIENT OUTREACH (OUTPATIENT)
Dept: CASE MANAGEMENT | Age: 72
End: 2022-08-17

## 2022-08-17 NOTE — PROGRESS NOTES
Called and spoke briefly to wife, [facility]. Said she is at [employer] [facility] with him, he is having a biopsy done. Asking if CTN could call back tomorrow. Advised will be glad to do so. Called wife back on 8/18. Goals Addressed                   This Visit's Progress     Patient verbalizes understanding of self-management goals of living with Congestive Heart Failure        7/26/22 [facility] 7/19-7/23 CHF  Reviewed discharge instructions with wife using teach back. Reviewed meds with wife, education provided on new med, using teach back. Reviewed red flags: sob, weight gain, swelling in lower legs/feet, chest discomfort, fever,nausea,vomiting,diarrhea. Reminded wife to check his weight q am, wearing same clothing, same time of day- record weights- if gains > 3 lb in 2 days or gains > 5 lb in one week, to notify MD asap for changes in meds. Follow low salt diet  CYDNEY with pcp, , 7/28 at 1pm.  F/u with cardi within 1 week. Wife to schedule. Given info on [employer] as resource. Given CTN contact info if questions/concerns. mbt  8/3/22  Per wife, having PET scan done today- to check spot on lungs. Saw cardio, Dr.Mark Abrams, last week- dx with Afib and Aflutter. Started him on Amiodarone for a few days and then went back for recheck- no change. So, 7/28, went to [address] and cardio \"shocked heart\" back into rhythm. Continues on O2- doing better about using the O2. No edema, no weight gain- has lost a few lbs. BP has been good. Did quit smoking about 2 weeks ago- wife is thrilled. Says he is making slow progress but is moving forward. No red flags noted at this time. Advised continue current POC. CTN to check back in about a week. mbt    8/18/22  Wife reports he had biopsy yesterday-at [facility], of lymph nodes. Waiting on results. Ordered by pulmonary, . Has appt with pulmonary 8/30 but wife wants to get results before then,  has been worried.   She has requested ,pcp, review results for them. Meanwhile, says  has not smoked in over 6 weeks. Not coughing at night anymore. Has lost 5 lb, now weight is 201 lb. Given support for changes he has made. Encouraged to continue current POC. CTN to f/u next week, call if questions/concerns. cole

## 2022-08-25 ENCOUNTER — PATIENT OUTREACH (OUTPATIENT)
Dept: CASE MANAGEMENT | Age: 72
End: 2022-08-25

## 2022-08-25 NOTE — PROGRESS NOTES
Patient has graduated from the Transitions of Care Coordination  program on 8/25/22. Patient/family has the ability to self-manage at this time Care management goals have been completed. Patient was not referred to the [facility] team for further management. Goals Addressed                   This Visit's Progress     COMPLETED: Patient verbalizes understanding of self-management goals of living with Congestive Heart Failure        7/26/22 [facility] 7/19-7/23 CHF  Reviewed discharge instructions with wife using teach back. Reviewed meds with wife, education provided on new med, using teach back. Reviewed red flags: sob, weight gain, swelling in lower legs/feet, chest discomfort, fever,nausea,vomiting,diarrhea. Reminded wife to check his weight q am, wearing same clothing, same time of day- record weights- if gains > 3 lb in 2 days or gains > 5 lb in one week, to notify MD asap for changes in meds. Follow low salt diet  CYDNEY with pcp, , 7/28 at 1pm.  F/u with cardi within 1 week. Wife to schedule. Given info on [employer] as resource. Given CTN contact info if questions/concerns. mbt  8/3/22  Per wife, having PET scan done today- to check spot on lungs. Saw cardio, Dr.Mark Abrams, last week- dx with Afib and Aflutter. Started him on Amiodarone for a few days and then went back for recheck- no change. So, 7/28, went to [facility] and cardio \"shocked heart\" back into rhythm. Continues on O2- doing better about using the O2. No edema, no weight gain- has lost a few lbs. BP has been good. Did quit smoking about 2 weeks ago- wife is thrilled. Says he is making slow progress but is moving forward. No red flags noted at this time. Advised continue current POC. CTN to check back in about a week. mbt    8/18/22  Wife reports he had biopsy yesterday-at [facility], of lymph nodes. Waiting on results. Ordered by pulmonary, .   Has appt with pulmonary 8/30 but wife wants to get results before then,  has been worried. She has requested ,pcp, review results for them. Meanwhile, says  has not smoked in over 6 weeks. Not coughing at night anymore. Has lost 5 lb, now weight is 201 lb. Given support for changes he has made. Encouraged to continue current POC. CTN to f/u next week, call if questions/concerns. mbt  8/25/22  Wife reports he is doing well. PET scan done and Bronchoscopy. Lymph nodes ok. Will see pulmonary next week. Walking bid, not needing the oxygen as much. Continues to be smoke free, weight stable. No red flags noted. Wished him well.mbt              Patient has Care Transition Nurse's contact information for any further questions, concerns, or needs. Patients upcoming visits:  No future appointments.

## 2022-09-23 ENCOUNTER — TRANSCRIBE ORDER (OUTPATIENT)
Dept: SCHEDULING | Age: 72
End: 2022-09-23

## 2022-09-23 ENCOUNTER — HOSPITAL ENCOUNTER (OUTPATIENT)
Dept: CT IMAGING | Age: 72
Discharge: HOME | End: 2022-09-23
Payer: MEDICARE

## 2022-09-23 DIAGNOSIS — R06.00 DYSPNEA: Primary | ICD-10-CM

## 2022-09-23 DIAGNOSIS — I27.20 PULMONARY HYPERTENSION (HCC): ICD-10-CM

## 2022-09-23 DIAGNOSIS — R06.00 DYSPNEA: ICD-10-CM

## 2022-09-23 PROCEDURE — 74011000636 HC RX REV CODE- 636: Performed by: RADIOLOGY

## 2022-09-23 PROCEDURE — 71275 CT ANGIOGRAPHY CHEST: CPT

## 2022-09-23 RX ADMIN — IOPAMIDOL 100 ML: 755 INJECTION, SOLUTION INTRAVENOUS at 12:24

## 2022-10-31 ENCOUNTER — TRANSCRIBE ORDER (OUTPATIENT)
Dept: SCHEDULING | Age: 72
End: 2022-10-31

## 2022-10-31 DIAGNOSIS — R06.02 SHORTNESS OF BREATH: Primary | ICD-10-CM

## 2022-11-07 ENCOUNTER — HOSPITAL ENCOUNTER (OUTPATIENT)
Dept: CT IMAGING | Age: 72
Discharge: HOME | End: 2022-11-07
Attending: INTERNAL MEDICINE
Payer: MEDICARE

## 2022-11-07 DIAGNOSIS — R06.02 SHORTNESS OF BREATH: ICD-10-CM

## 2022-11-07 PROCEDURE — 71250 CT THORAX DX C-: CPT
